# Patient Record
Sex: FEMALE | Race: WHITE | Employment: FULL TIME
[De-identification: names, ages, dates, MRNs, and addresses within clinical notes are randomized per-mention and may not be internally consistent; named-entity substitution may affect disease eponyms.]

---

## 2017-06-14 PROBLEM — Z34.01 ENCOUNTER FOR SUPERVISION OF NORMAL FIRST PREGNANCY IN FIRST TRIMESTER (HCC): Status: ACTIVE | Noted: 2017-06-14

## 2017-06-14 PROBLEM — Z34.01 ENCOUNTER FOR SUPERVISION OF NORMAL FIRST PREGNANCY IN FIRST TRIMESTER: Status: ACTIVE | Noted: 2017-06-14

## 2017-06-14 PROCEDURE — 87591 N.GONORRHOEAE DNA AMP PROB: CPT | Performed by: OBSTETRICS & GYNECOLOGY

## 2017-06-14 PROCEDURE — 87491 CHLMYD TRACH DNA AMP PROBE: CPT | Performed by: OBSTETRICS & GYNECOLOGY

## 2017-06-20 PROCEDURE — 86901 BLOOD TYPING SEROLOGIC RH(D): CPT | Performed by: OBSTETRICS & GYNECOLOGY

## 2017-06-20 PROCEDURE — 86900 BLOOD TYPING SEROLOGIC ABO: CPT | Performed by: OBSTETRICS & GYNECOLOGY

## 2017-06-20 PROCEDURE — 87340 HEPATITIS B SURFACE AG IA: CPT | Performed by: OBSTETRICS & GYNECOLOGY

## 2017-06-20 PROCEDURE — 86780 TREPONEMA PALLIDUM: CPT | Performed by: OBSTETRICS & GYNECOLOGY

## 2017-06-20 PROCEDURE — 36415 COLL VENOUS BLD VENIPUNCTURE: CPT | Performed by: OBSTETRICS & GYNECOLOGY

## 2017-06-20 PROCEDURE — 85025 COMPLETE CBC W/AUTO DIFF WBC: CPT | Performed by: OBSTETRICS & GYNECOLOGY

## 2017-06-20 PROCEDURE — 87389 HIV-1 AG W/HIV-1&-2 AB AG IA: CPT | Performed by: OBSTETRICS & GYNECOLOGY

## 2017-06-20 PROCEDURE — 86762 RUBELLA ANTIBODY: CPT | Performed by: OBSTETRICS & GYNECOLOGY

## 2017-06-20 PROCEDURE — 86850 RBC ANTIBODY SCREEN: CPT | Performed by: OBSTETRICS & GYNECOLOGY

## 2017-06-26 ENCOUNTER — SURGERY (OUTPATIENT)
Age: 32
End: 2017-06-26

## 2017-06-26 ENCOUNTER — ANESTHESIA EVENT (OUTPATIENT)
Dept: SURGERY | Facility: HOSPITAL | Age: 32
End: 2017-06-26

## 2017-06-26 ENCOUNTER — ANESTHESIA (OUTPATIENT)
Dept: SURGERY | Facility: HOSPITAL | Age: 32
End: 2017-06-26

## 2017-06-26 ENCOUNTER — HOSPITAL ENCOUNTER (OUTPATIENT)
Facility: HOSPITAL | Age: 32
Setting detail: HOSPITAL OUTPATIENT SURGERY
Discharge: HOME | End: 2017-06-26
Attending: OBSTETRICS & GYNECOLOGY | Admitting: OBSTETRICS & GYNECOLOGY
Payer: COMMERCIAL

## 2017-06-26 VITALS
HEART RATE: 83 BPM | BODY MASS INDEX: 32.49 KG/M2 | DIASTOLIC BLOOD PRESSURE: 83 MMHG | WEIGHT: 195 LBS | TEMPERATURE: 98 F | SYSTOLIC BLOOD PRESSURE: 121 MMHG | HEIGHT: 65 IN | RESPIRATION RATE: 18 BRPM | OXYGEN SATURATION: 100 %

## 2017-06-26 PROCEDURE — 88305 TISSUE EXAM BY PATHOLOGIST: CPT | Performed by: OBSTETRICS & GYNECOLOGY

## 2017-06-26 PROCEDURE — 10D17ZZ EXTRACTION OF PRODUCTS OF CONCEPTION, RETAINED, VIA NATURAL OR ARTIFICIAL OPENING: ICD-10-PCS | Performed by: OBSTETRICS & GYNECOLOGY

## 2017-06-26 RX ORDER — ONDANSETRON 2 MG/ML
4 INJECTION INTRAMUSCULAR; INTRAVENOUS AS NEEDED
Status: DISCONTINUED | OUTPATIENT
Start: 2017-06-26 | End: 2017-06-26

## 2017-06-26 RX ORDER — SODIUM CHLORIDE, SODIUM LACTATE, POTASSIUM CHLORIDE, CALCIUM CHLORIDE 600; 310; 30; 20 MG/100ML; MG/100ML; MG/100ML; MG/100ML
INJECTION, SOLUTION INTRAVENOUS CONTINUOUS
Status: DISCONTINUED | OUTPATIENT
Start: 2017-06-26 | End: 2017-06-26

## 2017-06-26 RX ORDER — HYDROCODONE BITARTRATE AND ACETAMINOPHEN 5; 325 MG/1; MG/1
2 TABLET ORAL AS NEEDED
Status: DISCONTINUED | OUTPATIENT
Start: 2017-06-26 | End: 2017-06-26

## 2017-06-26 RX ORDER — NALOXONE HYDROCHLORIDE 0.4 MG/ML
80 INJECTION, SOLUTION INTRAMUSCULAR; INTRAVENOUS; SUBCUTANEOUS AS NEEDED
Status: DISCONTINUED | OUTPATIENT
Start: 2017-06-26 | End: 2017-06-26

## 2017-06-26 RX ORDER — HYDROCODONE BITARTRATE AND ACETAMINOPHEN 5; 325 MG/1; MG/1
1 TABLET ORAL AS NEEDED
Status: DISCONTINUED | OUTPATIENT
Start: 2017-06-26 | End: 2017-06-26

## 2017-06-26 RX ORDER — HYDROMORPHONE HYDROCHLORIDE 1 MG/ML
0.4 INJECTION, SOLUTION INTRAMUSCULAR; INTRAVENOUS; SUBCUTANEOUS EVERY 5 MIN PRN
Status: DISCONTINUED | OUTPATIENT
Start: 2017-06-26 | End: 2017-06-26

## 2017-06-26 NOTE — ANESTHESIA POSTPROCEDURE EVALUATION
[address] [place] [address] 280 Mockapetris Patient Status:  Hospital Outpatient Surgery   Age/Gender 36year old female MRN [id number]   Location [address] [place] [address] Attending Raffy Manzo MD   [address] [facility] Day # 0 [facility] Jagdish Amin

## 2017-06-26 NOTE — ANESTHESIA PREPROCEDURE EVALUATION
PRE-OP EVALUATION    Patient Name: Michi Adrian Mockapetris    Pre-op Diagnosis: MISSED     Procedure(s):  SUCTION DILATION AND CURETTAGE    Surgeon(s) and Role:     Rosario Negron MD - Primary    Pre-op vitals reviewed.   Temp: 98.2 °F (36.8 °C 06/20/2017   MCH 29.9 06/20/2017   MCHC 33.9 06/20/2017   RDW 12.1 06/20/2017   .0 06/20/2017               Airway      Mallampati: II  Mouth opening: >3 FB  TM distance: > 6 cm  Neck ROM: full Cardiovascular    Cardiovascular exam normal.         D

## 2017-06-26 NOTE — OPERATIVE REPORT
OPERATIVE REPORT      PREOPERATIVE DIAGNOSIS:  Missed . POSTOPERATIVE DIAGNOSIS:  Missed . PROCEDURE PERFORMED:  Suction D and C. ANESTHESIA:  MAC. OPERATIVE FINDINGS:  There were normal endometrial contours.  Large amount of produc

## 2017-06-26 NOTE — BRIEF OP NOTE
Pre-Operative Diagnosis: MISSED      Post-Operative Diagnosis: MISSED      Procedure Performed:   Procedure(s):  SUCTION DILATION AND CURETTAGE    Surgeon(s) and Role:     Richard Lui MD - Primary    Assistant(s):        Surgical

## 2017-06-26 NOTE — H&P
Dharmesh Camejo is a 36year old female. No chief complaint on file. HPI:    This is a 35 yo primagravid woman who presents for a D&C due to missed .   She had an initial normal prenatal visit, but subsequently saw some passage of possi Neurological: normal    Abdomen: normal          TV ultrasound shows a 8+ week embryonic demise    ASSESSMENT/ PLAN:   Missed  in first trimester  Discussed diagnosis of embryonic loss, potential etiologies, options of observation versus surgical m

## 2017-06-27 ENCOUNTER — TELEPHONE (OUTPATIENT)
Dept: OBGYN UNIT | Facility: HOSPITAL | Age: 32
End: 2017-06-27

## 2017-07-11 PROBLEM — Z34.01 ENCOUNTER FOR SUPERVISION OF NORMAL FIRST PREGNANCY IN FIRST TRIMESTER (HCC): Status: RESOLVED | Noted: 2017-06-14 | Resolved: 2017-07-11

## 2017-07-11 PROBLEM — Z34.01 ENCOUNTER FOR SUPERVISION OF NORMAL FIRST PREGNANCY IN FIRST TRIMESTER: Status: RESOLVED | Noted: 2017-06-14 | Resolved: 2017-07-11

## 2018-05-15 PROBLEM — J34.2 NASAL SEPTAL DEVIATION: Status: ACTIVE | Noted: 2018-05-15

## 2018-05-15 PROBLEM — R09.82 POST-NASAL DRIP: Status: ACTIVE | Noted: 2018-05-15

## 2018-05-15 PROBLEM — J35.8 TONSIL STONE: Status: ACTIVE | Noted: 2018-05-15

## 2018-05-15 PROBLEM — R09.81 NASAL CONGESTION: Status: ACTIVE | Noted: 2018-05-15

## 2018-05-23 PROBLEM — R09.81 NASAL CONGESTION: Status: RESOLVED | Noted: 2018-05-15 | Resolved: 2018-05-23

## 2018-05-23 PROBLEM — R09.82 POST-NASAL DRIP: Status: RESOLVED | Noted: 2018-05-15 | Resolved: 2018-05-23

## 2018-05-23 PROBLEM — E66.9 OBESITY (BMI 30-39.9): Status: ACTIVE | Noted: 2018-05-23

## 2018-05-23 PROCEDURE — 88175 CYTOPATH C/V AUTO FLUID REDO: CPT | Performed by: FAMILY MEDICINE

## 2018-05-23 PROCEDURE — 87624 HPV HI-RISK TYP POOLED RSLT: CPT | Performed by: FAMILY MEDICINE

## 2018-08-15 PROCEDURE — 86901 BLOOD TYPING SEROLOGIC RH(D): CPT | Performed by: OBSTETRICS & GYNECOLOGY

## 2018-08-15 PROCEDURE — 86780 TREPONEMA PALLIDUM: CPT | Performed by: OBSTETRICS & GYNECOLOGY

## 2018-08-15 PROCEDURE — 86850 RBC ANTIBODY SCREEN: CPT | Performed by: OBSTETRICS & GYNECOLOGY

## 2018-08-15 PROCEDURE — 86762 RUBELLA ANTIBODY: CPT | Performed by: OBSTETRICS & GYNECOLOGY

## 2018-08-15 PROCEDURE — 86900 BLOOD TYPING SEROLOGIC ABO: CPT | Performed by: OBSTETRICS & GYNECOLOGY

## 2018-08-15 PROCEDURE — 87340 HEPATITIS B SURFACE AG IA: CPT | Performed by: OBSTETRICS & GYNECOLOGY

## 2018-08-15 PROCEDURE — 87389 HIV-1 AG W/HIV-1&-2 AB AG IA: CPT | Performed by: OBSTETRICS & GYNECOLOGY

## 2018-08-17 PROBLEM — B00.9 HSV INFECTION: Status: ACTIVE | Noted: 2018-08-17

## 2018-09-18 PROCEDURE — 87086 URINE CULTURE/COLONY COUNT: CPT | Performed by: OBSTETRICS & GYNECOLOGY

## 2018-10-11 PROCEDURE — 36415 COLL VENOUS BLD VENIPUNCTURE: CPT | Performed by: OBSTETRICS & GYNECOLOGY

## 2018-10-11 PROCEDURE — 82105 ALPHA-FETOPROTEIN SERUM: CPT | Performed by: OBSTETRICS & GYNECOLOGY

## 2018-10-29 ENCOUNTER — OFFICE VISIT (OUTPATIENT)
Dept: PERINATAL CARE | Facility: HOSPITAL | Age: 33
End: 2018-10-29
Attending: OBSTETRICS & GYNECOLOGY
Payer: COMMERCIAL

## 2018-10-29 VITALS
HEIGHT: 65 IN | WEIGHT: 199 LBS | SYSTOLIC BLOOD PRESSURE: 130 MMHG | HEART RATE: 80 BPM | DIASTOLIC BLOOD PRESSURE: 86 MMHG | BODY MASS INDEX: 33.15 KG/M2

## 2018-10-29 DIAGNOSIS — O09.812 PREGNANCY RESULTING FROM IN VITRO FERTILIZATION IN SECOND TRIMESTER: ICD-10-CM

## 2018-10-29 DIAGNOSIS — E66.9 OBESITY (BMI 30-39.9): ICD-10-CM

## 2018-10-29 PROCEDURE — 99243 OFF/OP CNSLTJ NEW/EST LOW 30: CPT | Performed by: OBSTETRICS & GYNECOLOGY

## 2018-10-29 PROCEDURE — 76811 OB US DETAILED SNGL FETUS: CPT | Performed by: OBSTETRICS & GYNECOLOGY

## 2018-10-29 NOTE — PROGRESS NOTES
Indication: IVF.   ____________________________________________________________________________  History: Age: 34 years.  : 2 Para: 0.  ____________________________________________________________________________  Dating:  LMP: 2018 EDC: 03/1 mm.  ____________________________________________________________________________  Comments:    Dear Dr. Moustapha Lorenz,       Thank you for requesting  an ultrasound and consultation for  [facility]  regarding IVF( Dr. Babs Gu).  Her prenatal records a No ultrasound evidence of markers for aneuploidy are seen. She understands that ultrasound exam cannot exclude genetic abnormalities. The limitations of ultrasound were discussed. IMPRESSION:    1.  IUP at  20 0/7 wks    2.   Scan consistent with da

## 2018-11-27 ENCOUNTER — OFFICE VISIT (OUTPATIENT)
Dept: PERINATAL CARE | Facility: HOSPITAL | Age: 33
End: 2018-11-27
Attending: OBSTETRICS & GYNECOLOGY
Payer: COMMERCIAL

## 2018-11-27 VITALS
HEART RATE: 97 BPM | DIASTOLIC BLOOD PRESSURE: 84 MMHG | BODY MASS INDEX: 34 KG/M2 | SYSTOLIC BLOOD PRESSURE: 136 MMHG | WEIGHT: 204 LBS

## 2018-11-27 DIAGNOSIS — O99.212 OBESITY AFFECTING PREGNANCY IN SECOND TRIMESTER: ICD-10-CM

## 2018-11-27 DIAGNOSIS — O09.812 PREGNANCY RESULTING FROM ASSISTED REPRODUCTIVE TECHNOLOGY, SECOND TRIMESTER: ICD-10-CM

## 2018-11-27 PROCEDURE — 93325 DOPPLER ECHO COLOR FLOW MAPG: CPT | Performed by: OBSTETRICS & GYNECOLOGY

## 2018-11-27 PROCEDURE — 76825 ECHO EXAM OF FETAL HEART: CPT | Performed by: OBSTETRICS & GYNECOLOGY

## 2018-11-27 PROCEDURE — 99215 OFFICE O/P EST HI 40 MIN: CPT | Performed by: OBSTETRICS & GYNECOLOGY

## 2018-11-27 PROCEDURE — 76827 ECHO EXAM OF FETAL HEART: CPT | Performed by: OBSTETRICS & GYNECOLOGY

## 2018-12-18 PROCEDURE — 86780 TREPONEMA PALLIDUM: CPT | Performed by: OBSTETRICS & GYNECOLOGY

## 2018-12-18 PROCEDURE — 87389 HIV-1 AG W/HIV-1&-2 AB AG IA: CPT | Performed by: OBSTETRICS & GYNECOLOGY

## 2019-01-21 NOTE — PROGRESS NOTES
Randall Gtz  Dear Dr. Loyde Buerger     Thank you for requesting ultrasound evaluation and maternal fetal medicine consultation on your patient Ana Maria Rose.   As you are aware she is a 34year old female  with a bladder. Brain: Visualized and normal appearance: brain parenchyma, posterior fossa, cavum septi pellucidi. Spine: appears normal but suboptimal  Heart: Visualized and normal appearance: four-chamber view, right outflow tract.    Left outflow tract: sub

## 2019-01-22 ENCOUNTER — OFFICE VISIT (OUTPATIENT)
Dept: PERINATAL CARE | Facility: HOSPITAL | Age: 34
End: 2019-01-22
Attending: OBSTETRICS & GYNECOLOGY
Payer: COMMERCIAL

## 2019-01-22 VITALS
WEIGHT: 219 LBS | HEIGHT: 65 IN | BODY MASS INDEX: 36.49 KG/M2 | DIASTOLIC BLOOD PRESSURE: 82 MMHG | SYSTOLIC BLOOD PRESSURE: 128 MMHG | HEART RATE: 105 BPM

## 2019-01-22 DIAGNOSIS — O09.812 PREGNANCY RESULTING FROM ASSISTED REPRODUCTIVE TECHNOLOGY, SECOND TRIMESTER: ICD-10-CM

## 2019-01-22 DIAGNOSIS — O99.212 OBESITY AFFECTING PREGNANCY IN SECOND TRIMESTER: ICD-10-CM

## 2019-01-22 PROCEDURE — 76805 OB US >/= 14 WKS SNGL FETUS: CPT | Performed by: OBSTETRICS & GYNECOLOGY

## 2019-01-22 PROCEDURE — 99213 OFFICE O/P EST LOW 20 MIN: CPT | Performed by: OBSTETRICS & GYNECOLOGY

## 2019-01-22 PROCEDURE — 76819 FETAL BIOPHYS PROFIL W/O NST: CPT | Performed by: OBSTETRICS & GYNECOLOGY

## 2019-02-23 ENCOUNTER — HOSPITAL ENCOUNTER (OUTPATIENT)
Facility: HOSPITAL | Age: 34
Setting detail: OBSERVATION
Discharge: HOME | End: 2019-02-23
Attending: OBSTETRICS & GYNECOLOGY | Admitting: OBSTETRICS & GYNECOLOGY
Payer: COMMERCIAL

## 2019-02-23 VITALS
HEART RATE: 100 BPM | BODY MASS INDEX: 37.49 KG/M2 | SYSTOLIC BLOOD PRESSURE: 125 MMHG | HEIGHT: 65 IN | TEMPERATURE: 98 F | RESPIRATION RATE: 16 BRPM | DIASTOLIC BLOOD PRESSURE: 79 MMHG | WEIGHT: 225 LBS

## 2019-02-23 PROBLEM — Z34.90 PREGNANCY (HCC): Status: ACTIVE | Noted: 2019-02-23

## 2019-02-23 PROBLEM — Z34.90 PREGNANCY: Status: ACTIVE | Noted: 2019-02-23

## 2019-02-23 LAB
ALBUMIN SERPL-MCNC: 2.7 G/DL (ref 3.4–5)
ALBUMIN/GLOB SERPL: 0.7 {RATIO} (ref 1–2)
ALP LIVER SERPL-CCNC: 127 U/L (ref 37–98)
ALT SERPL-CCNC: 14 U/L (ref 13–56)
ANION GAP SERPL CALC-SCNC: 9 MMOL/L (ref 0–18)
AST SERPL-CCNC: 11 U/L (ref 15–37)
BASOPHILS # BLD AUTO: 0.01 X10(3) UL (ref 0–0.2)
BASOPHILS NFR BLD AUTO: 0.1 %
BILIRUB SERPL-MCNC: 0.4 MG/DL (ref 0.1–2)
BUN BLD-MCNC: 8 MG/DL (ref 7–18)
BUN/CREAT SERPL: 13.1 (ref 10–20)
CALCIUM BLD-MCNC: 9.3 MG/DL (ref 8.5–10.1)
CHLORIDE SERPL-SCNC: 106 MMOL/L (ref 98–107)
CO2 SERPL-SCNC: 24 MMOL/L (ref 21–32)
CREAT BLD-MCNC: 0.61 MG/DL (ref 0.55–1.02)
CREAT UR-SCNC: 1.21 G/24 HR (ref 0.6–1.8)
CREAT UR-SCNC: 41.1 MG/DL
DEPRECATED RDW RBC AUTO: 42.4 FL (ref 35.1–46.3)
EOSINOPHIL # BLD AUTO: 0.02 X10(3) UL (ref 0–0.7)
EOSINOPHIL NFR BLD AUTO: 0.2 %
ERYTHROCYTE [DISTWIDTH] IN BLOOD BY AUTOMATED COUNT: 13.2 % (ref 11–15)
GLOBULIN PLAS-MCNC: 3.7 G/DL (ref 2.8–4.4)
GLUCOSE BLD-MCNC: 93 MG/DL (ref 70–99)
HCT VFR BLD AUTO: 35 % (ref 35–48)
HGB BLD-MCNC: 12.5 G/DL (ref 12–16)
IMM GRANULOCYTES # BLD AUTO: 0.04 X10(3) UL (ref 0–1)
IMM GRANULOCYTES NFR BLD: 0.3 %
LYMPHOCYTES # BLD AUTO: 2.01 X10(3) UL (ref 1–4)
LYMPHOCYTES NFR BLD AUTO: 17.3 %
M PROTEIN 24H UR ELPH-MRATE: 449.8 MG/24 HR (ref ?–149.1)
M PROTEIN MFR SERPL ELPH: 6.4 G/DL (ref 6.4–8.2)
MCH RBC QN AUTO: 31.6 PG (ref 26–34)
MCHC RBC AUTO-ENTMCNC: 35.7 G/DL (ref 31–37)
MCV RBC AUTO: 88.6 FL (ref 80–100)
MONOCYTES # BLD AUTO: 0.68 X10(3) UL (ref 0.1–1)
MONOCYTES NFR BLD AUTO: 5.9 %
NEUTROPHILS # BLD AUTO: 8.83 X10 (3) UL (ref 1.5–7.7)
NEUTROPHILS # BLD AUTO: 8.83 X10(3) UL (ref 1.5–7.7)
NEUTROPHILS NFR BLD AUTO: 76.2 %
OSMOLALITY SERPL CALC.SUM OF ELEC: 286 MOSM/KG (ref 275–295)
PLATELET # BLD AUTO: 285 10(3)UL (ref 150–450)
POTASSIUM SERPL-SCNC: 4.3 MMOL/L (ref 3.5–5.1)
PROT UR-MCNC: 17.8 MG/DL
PROT/CREAT UR-RTO: 0.43
RBC # BLD AUTO: 3.95 X10(6)UL (ref 3.8–5.3)
SODIUM SERPL-SCNC: 139 MMOL/L (ref 136–145)
SPECIMEN VOL UR: 2600 ML
SPECIMEN VOL UR: 2600 ML
URATE SERPL-MCNC: 3.2 MG/DL (ref 2.6–6)
WBC # BLD AUTO: 11.6 X10(3) UL (ref 4–11)

## 2019-02-23 PROCEDURE — 84156 ASSAY OF PROTEIN URINE: CPT | Performed by: OBSTETRICS & GYNECOLOGY

## 2019-02-23 PROCEDURE — 82570 ASSAY OF URINE CREATININE: CPT | Performed by: OBSTETRICS & GYNECOLOGY

## 2019-02-23 PROCEDURE — 36415 COLL VENOUS BLD VENIPUNCTURE: CPT

## 2019-02-23 PROCEDURE — 59025 FETAL NON-STRESS TEST: CPT

## 2019-02-23 PROCEDURE — 99213 OFFICE O/P EST LOW 20 MIN: CPT

## 2019-02-23 PROCEDURE — 96372 THER/PROPH/DIAG INJ SC/IM: CPT

## 2019-02-23 PROCEDURE — 80053 COMPREHEN METABOLIC PANEL: CPT | Performed by: OBSTETRICS & GYNECOLOGY

## 2019-02-23 PROCEDURE — 84550 ASSAY OF BLOOD/URIC ACID: CPT | Performed by: OBSTETRICS & GYNECOLOGY

## 2019-02-23 PROCEDURE — 85025 COMPLETE CBC W/AUTO DIFF WBC: CPT | Performed by: OBSTETRICS & GYNECOLOGY

## 2019-02-23 RX ORDER — BETAMETHASONE SODIUM PHOSPHATE AND BETAMETHASONE ACETATE 3; 3 MG/ML; MG/ML
12 INJECTION, SUSPENSION INTRA-ARTICULAR; INTRALESIONAL; INTRAMUSCULAR; SOFT TISSUE ONCE
Status: COMPLETED | OUTPATIENT
Start: 2019-02-23 | End: 2019-02-23

## 2019-02-23 NOTE — PROGRESS NOTES
d Hospital  Labor and Delivery Prenatal History and Physical Interval Addendum/Triage assessment  Please see full Prenatal Record for this pregnancy      SUBJECTIVE:    Interval History:      This is a pregnancy at 36 weeks who presents to Labor and Deliver

## 2019-02-23 NOTE — PROGRESS NOTES
Discharged to home per ambulatory in stable condition, not in active labor with written and verbal instructions. Patient verbalizes understanding of information given.

## 2019-02-23 NOTE — PROGRESS NOTES
Pt is a 34year old female admitted to TRG2/TRG2-A. Patient presents with:  R/o Pih: presents with 24 hours urine collection (that was not kept refrigerated) for BP check, labs, NST     Pt is  36w5d intra-uterine pregnancy.   History obtained, cons

## 2019-02-24 ENCOUNTER — HOSPITAL ENCOUNTER (OUTPATIENT)
Facility: HOSPITAL | Age: 34
Discharge: HOME | End: 2019-02-24
Attending: OBSTETRICS & GYNECOLOGY | Admitting: OBSTETRICS & GYNECOLOGY
Payer: COMMERCIAL

## 2019-02-24 ENCOUNTER — APPOINTMENT (OUTPATIENT)
Dept: OBGYN CLINIC | Facility: HOSPITAL | Age: 34
End: 2019-02-24
Payer: COMMERCIAL

## 2019-02-24 VITALS — SYSTOLIC BLOOD PRESSURE: 126 MMHG | DIASTOLIC BLOOD PRESSURE: 80 MMHG | HEART RATE: 85 BPM

## 2019-02-24 PROCEDURE — 59025 FETAL NON-STRESS TEST: CPT

## 2019-02-24 PROCEDURE — 96372 THER/PROPH/DIAG INJ SC/IM: CPT

## 2019-02-24 RX ORDER — BETAMETHASONE SODIUM PHOSPHATE AND BETAMETHASONE ACETATE 3; 3 MG/ML; MG/ML
12 INJECTION, SUSPENSION INTRA-ARTICULAR; INTRALESIONAL; INTRAMUSCULAR; SOFT TISSUE ONCE
Status: COMPLETED | OUTPATIENT
Start: 2019-02-24 | End: 2019-02-24

## 2019-02-24 NOTE — NST
Nonstress Test   Patient: Miranda Ambriz Mockapetris    Gestation: 36w6d    NST:       Variability: Moderate           Accelerations: Yes           Decelerations: None            Baseline: 135 BPM           Uterine Irritability: No           Contractions: Irreg

## 2019-02-24 NOTE — PROGRESS NOTES
Pt is a 34year old female admitted to TRG2/TRG2-A. Patient presents with:  Betamethasone Injection: pt presents for nst, beta injection and bp checks     Pt is  36w6d intra-uterine pregnancy. History obtained, consents signed.  Oriented to room,

## 2019-02-24 NOTE — PROGRESS NOTES
Discharged to home per ambulatory in stable condition with written and verbal instructions.   Pt to return Monday, 2/25/19 for cdil induction

## 2019-02-25 ENCOUNTER — HOSPITAL ENCOUNTER (INPATIENT)
Facility: HOSPITAL | Age: 34
LOS: 4 days | Discharge: HOME | End: 2019-03-01
Attending: OBSTETRICS & GYNECOLOGY | Admitting: OBSTETRICS & GYNECOLOGY
Payer: COMMERCIAL

## 2019-02-25 ENCOUNTER — TELEPHONE (OUTPATIENT)
Dept: OBGYN UNIT | Facility: HOSPITAL | Age: 34
End: 2019-02-25

## 2019-02-25 ENCOUNTER — APPOINTMENT (OUTPATIENT)
Dept: OBGYN CLINIC | Facility: HOSPITAL | Age: 34
End: 2019-02-25
Payer: COMMERCIAL

## 2019-02-25 LAB
ALBUMIN SERPL-MCNC: 2.9 G/DL (ref 3.4–5)
ALBUMIN/GLOB SERPL: 0.7 {RATIO} (ref 1–2)
ALP LIVER SERPL-CCNC: 124 U/L (ref 37–98)
ALT SERPL-CCNC: 15 U/L (ref 13–56)
ANION GAP SERPL CALC-SCNC: 12 MMOL/L (ref 0–18)
ANTIBODY SCREEN: NEGATIVE
AST SERPL-CCNC: 28 U/L (ref 15–37)
BILIRUB SERPL-MCNC: 0.3 MG/DL (ref 0.1–2)
BUN BLD-MCNC: 7 MG/DL (ref 7–18)
BUN/CREAT SERPL: 11.7 (ref 10–20)
CALCIUM BLD-MCNC: 8.7 MG/DL (ref 8.5–10.1)
CHLORIDE SERPL-SCNC: 107 MMOL/L (ref 98–107)
CO2 SERPL-SCNC: 21 MMOL/L (ref 21–32)
CREAT BLD-MCNC: 0.6 MG/DL (ref 0.55–1.02)
DEPRECATED RDW RBC AUTO: 44.5 FL (ref 35.1–46.3)
ERYTHROCYTE [DISTWIDTH] IN BLOOD BY AUTOMATED COUNT: 13.5 % (ref 11–15)
GLOBULIN PLAS-MCNC: 3.9 G/DL (ref 2.8–4.4)
GLUCOSE BLD-MCNC: 114 MG/DL (ref 70–99)
HCT VFR BLD AUTO: 34.8 % (ref 35–48)
HGB BLD-MCNC: 11.8 G/DL (ref 12–16)
M PROTEIN MFR SERPL ELPH: 6.8 G/DL (ref 6.4–8.2)
MCH RBC QN AUTO: 31 PG (ref 26–34)
MCHC RBC AUTO-ENTMCNC: 33.9 G/DL (ref 31–37)
MCV RBC AUTO: 91.3 FL (ref 80–100)
OSMOLALITY SERPL CALC.SUM OF ELEC: 289 MOSM/KG (ref 275–295)
PLATELET # BLD AUTO: 344 10(3)UL (ref 150–450)
POTASSIUM SERPL-SCNC: 3.8 MMOL/L (ref 3.5–5.1)
RBC # BLD AUTO: 3.81 X10(6)UL (ref 3.8–5.3)
RH BLOOD TYPE: POSITIVE
SODIUM SERPL-SCNC: 140 MMOL/L (ref 136–145)
T PALLIDUM AB SER QL IA: NONREACTIVE
URATE SERPL-MCNC: 3.2 MG/DL (ref 2.6–6)
WBC # BLD AUTO: 16.8 X10(3) UL (ref 4–11)

## 2019-02-25 PROCEDURE — 80053 COMPREHEN METABOLIC PANEL: CPT | Performed by: OBSTETRICS & GYNECOLOGY

## 2019-02-25 PROCEDURE — 86850 RBC ANTIBODY SCREEN: CPT | Performed by: OBSTETRICS & GYNECOLOGY

## 2019-02-25 PROCEDURE — 86780 TREPONEMA PALLIDUM: CPT | Performed by: OBSTETRICS & GYNECOLOGY

## 2019-02-25 PROCEDURE — 86901 BLOOD TYPING SEROLOGIC RH(D): CPT | Performed by: OBSTETRICS & GYNECOLOGY

## 2019-02-25 PROCEDURE — 84550 ASSAY OF BLOOD/URIC ACID: CPT | Performed by: OBSTETRICS & GYNECOLOGY

## 2019-02-25 PROCEDURE — 86900 BLOOD TYPING SEROLOGIC ABO: CPT | Performed by: OBSTETRICS & GYNECOLOGY

## 2019-02-25 PROCEDURE — 85027 COMPLETE CBC AUTOMATED: CPT | Performed by: OBSTETRICS & GYNECOLOGY

## 2019-02-25 RX ORDER — SODIUM CHLORIDE, SODIUM LACTATE, POTASSIUM CHLORIDE, CALCIUM CHLORIDE 600; 310; 30; 20 MG/100ML; MG/100ML; MG/100ML; MG/100ML
INJECTION, SOLUTION INTRAVENOUS CONTINUOUS
Status: DISCONTINUED | OUTPATIENT
Start: 2019-02-25 | End: 2019-02-27

## 2019-02-25 RX ORDER — DEXTROSE, SODIUM CHLORIDE, SODIUM LACTATE, POTASSIUM CHLORIDE, AND CALCIUM CHLORIDE 5; .6; .31; .03; .02 G/100ML; G/100ML; G/100ML; G/100ML; G/100ML
INJECTION, SOLUTION INTRAVENOUS AS NEEDED
Status: DISCONTINUED | OUTPATIENT
Start: 2019-02-25 | End: 2019-02-27

## 2019-02-25 RX ORDER — TRISODIUM CITRATE DIHYDRATE AND CITRIC ACID MONOHYDRATE 500; 334 MG/5ML; MG/5ML
30 SOLUTION ORAL AS NEEDED
Status: DISCONTINUED | OUTPATIENT
Start: 2019-02-25 | End: 2019-02-27

## 2019-02-25 RX ORDER — TERBUTALINE SULFATE 1 MG/ML
0.25 INJECTION, SOLUTION SUBCUTANEOUS AS NEEDED
Status: DISCONTINUED | OUTPATIENT
Start: 2019-02-25 | End: 2019-02-27

## 2019-02-25 RX ORDER — ZOLPIDEM TARTRATE 5 MG/1
5 TABLET ORAL NIGHTLY PRN
Status: DISCONTINUED | OUTPATIENT
Start: 2019-02-25 | End: 2019-02-27

## 2019-02-25 RX ORDER — IBUPROFEN 600 MG/1
600 TABLET ORAL ONCE AS NEEDED
Status: DISCONTINUED | OUTPATIENT
Start: 2019-02-25 | End: 2019-02-27

## 2019-02-25 NOTE — PROGRESS NOTES
Pt is a 34year old female admitted to 104/-A. Patient presents with:  Scheduled Induction     Pt is  37w0d intra-uterine pregnancy. History obtained, consents signed. Oriented to room, staff, and plan of care.

## 2019-02-26 RX ORDER — FAMOTIDINE 20 MG/1
20 TABLET ORAL 2 TIMES DAILY
Status: DISCONTINUED | OUTPATIENT
Start: 2019-02-26 | End: 2019-02-27

## 2019-02-26 NOTE — PROGRESS NOTES
[facility]      Labor Progress Note    Tien Carmen Patient Status:  Inpatient    1985 MRN [id number]   Location [address] [facility] Attending Day Manley MD   Hosp Day # 1 PCP Jyotsna Flowers DO      SUBJECTIVE:    Inter

## 2019-02-26 NOTE — H&P
[address] and Delivery Prenatal History and Physical Interval Addendum  Please see full Prenatal Record for this pregnancy      SUBJECTIVE:    Interval History: This is a pregnancy at 37w1d weeks admitted for IOL due to mild pre-eclampsia.

## 2019-02-27 PROCEDURE — 0KQM0ZZ REPAIR PERINEUM MUSCLE, OPEN APPROACH: ICD-10-PCS | Performed by: OBSTETRICS & GYNECOLOGY

## 2019-02-27 PROCEDURE — 3E033VJ INTRODUCTION OF OTHER HORMONE INTO PERIPHERAL VEIN, PERCUTANEOUS APPROACH: ICD-10-PCS | Performed by: OBSTETRICS & GYNECOLOGY

## 2019-02-27 PROCEDURE — 3E0P7VZ INTRODUCTION OF HORMONE INTO FEMALE REPRODUCTIVE, VIA NATURAL OR ARTIFICIAL OPENING: ICD-10-PCS | Performed by: OBSTETRICS & GYNECOLOGY

## 2019-02-27 RX ORDER — CARBOPROST TROMETHAMINE 250 UG/ML
INJECTION, SOLUTION INTRAMUSCULAR
Status: DISCONTINUED
Start: 2019-02-27 | End: 2019-02-27 | Stop reason: WASHOUT

## 2019-02-27 RX ORDER — MISOPROSTOL 200 UG/1
TABLET ORAL
Status: DISPENSED
Start: 2019-02-27 | End: 2019-02-28

## 2019-02-27 RX ORDER — DOCUSATE SODIUM 100 MG/1
100 CAPSULE, LIQUID FILLED ORAL
Status: DISCONTINUED | OUTPATIENT
Start: 2019-02-27 | End: 2019-03-01

## 2019-02-27 RX ORDER — NALBUPHINE HCL 10 MG/ML
2.5 AMPUL (ML) INJECTION
Status: DISCONTINUED | OUTPATIENT
Start: 2019-02-27 | End: 2019-02-27

## 2019-02-27 RX ORDER — SIMETHICONE 80 MG
80 TABLET,CHEWABLE ORAL 3 TIMES DAILY PRN
Status: DISCONTINUED | OUTPATIENT
Start: 2019-02-27 | End: 2019-03-01

## 2019-02-27 RX ORDER — IBUPROFEN 600 MG/1
600 TABLET ORAL EVERY 6 HOURS
Status: DISCONTINUED | OUTPATIENT
Start: 2019-02-27 | End: 2019-03-01

## 2019-02-27 RX ORDER — EPHEDRINE SULFATE/0.9% NACL/PF 25 MG/5 ML
5 SYRINGE (ML) INTRAVENOUS AS NEEDED
Status: DISCONTINUED | OUTPATIENT
Start: 2019-02-27 | End: 2019-02-27

## 2019-02-27 RX ORDER — ZOLPIDEM TARTRATE 5 MG/1
5 TABLET ORAL NIGHTLY PRN
Status: DISCONTINUED | OUTPATIENT
Start: 2019-02-27 | End: 2019-03-01

## 2019-02-27 RX ORDER — BISACODYL 10 MG
10 SUPPOSITORY, RECTAL RECTAL ONCE AS NEEDED
Status: DISCONTINUED | OUTPATIENT
Start: 2019-02-27 | End: 2019-03-01

## 2019-02-27 RX ORDER — ACETAMINOPHEN 325 MG/1
650 TABLET ORAL EVERY 6 HOURS PRN
Status: DISCONTINUED | OUTPATIENT
Start: 2019-02-27 | End: 2019-03-01

## 2019-02-27 NOTE — PLAN OF CARE
Problem: Patient/Family Goals  Goal: Patient/Family Long Term Goal  Patient's Long Term Goal: Uncomplicated vaginal delivery    Interventions:  VS per protocol  I&O  Ice chips and sips as tolerated  EFM per protocol  Maintain IV as ordered  Antibiotics as

## 2019-02-27 NOTE — L&D DELIVERY NOTE
Milton, Girl [address] [facility] [[id number]]    Labor Events     labor?:  No   steroids?:  Full Course  Cervical ripening date/time:  2019 1650  Cervical ripening type:  Cervidil  Antibiotics received during labor?:  No  Antibiotics (enter # Completely pink    Heart rate Absent <100 bpm >100 bpm    Reflex irritability No response Grimace Cry or active withdrawal    Muscle tone Limp Some flexion Active motion    Respiratory effort Absent Weak cry; hypoventilation Good, crying               1 Mi

## 2019-02-27 NOTE — PROGRESS NOTES
SUBJECTIVE:   pt without complaints. Feeling painful contractions. She feels ready for pain meds    OBJECTIVE:  VS:  height is 5' 5\" (1.651 m) and weight is 225 lb (102.1 kg). Her oral temperature is 97.7 °F (36.5 °C).  Her blood pressure is 149/75 and he

## 2019-02-27 NOTE — PROGRESS NOTES
OB PN    No complaints. Denies HA, no changes in vision, no RUQ pain.     BP (!) 144/95   Pulse 77   Temp 97.5 °F (36.4 °C) (Oral)   Resp 16   Ht 5' 5\" (1.651 m)   Wt 225 lb (102.1 kg)   LMP 06/11/2018   BMI 37.44 kg/m²      Intake/Output Summary (Last 24

## 2019-02-27 NOTE — PROGRESS NOTES
Report received from Jacinto Mdundo. Pt denies any complaints. POC discussed, pt denies any questions. Call light within reach.

## 2019-02-27 NOTE — PROGRESS NOTES
OB PN    No complaints. Contractions 4-5/10 in pain. Not ready for epidural yet.     /72   Pulse 85   Temp 97.5 °F (36.4 °C) (Oral)   Resp 16   Ht 5' 5\" (1.651 m)   Wt 225 lb (102.1 kg)   LMP 06/11/2018   BMI 37.44 kg/m²   EFM: 150/mod/+acc/-dec  T

## 2019-02-28 LAB
BASOPHILS # BLD AUTO: 0.02 X10(3) UL (ref 0–0.2)
BASOPHILS NFR BLD AUTO: 0.1 %
DEPRECATED RDW RBC AUTO: 45.5 FL (ref 35.1–46.3)
EOSINOPHIL # BLD AUTO: 0.05 X10(3) UL (ref 0–0.7)
EOSINOPHIL NFR BLD AUTO: 0.3 %
ERYTHROCYTE [DISTWIDTH] IN BLOOD BY AUTOMATED COUNT: 13.6 % (ref 11–15)
HCT VFR BLD AUTO: 34.1 % (ref 35–48)
HGB BLD-MCNC: 11.4 G/DL (ref 12–16)
IMM GRANULOCYTES # BLD AUTO: 0.11 X10(3) UL (ref 0–1)
IMM GRANULOCYTES NFR BLD: 0.6 %
LYMPHOCYTES # BLD AUTO: 2.41 X10(3) UL (ref 1–4)
LYMPHOCYTES NFR BLD AUTO: 13.2 %
MCH RBC QN AUTO: 30.7 PG (ref 26–34)
MCHC RBC AUTO-ENTMCNC: 33.4 G/DL (ref 31–37)
MCV RBC AUTO: 91.9 FL (ref 80–100)
MONOCYTES # BLD AUTO: 0.89 X10(3) UL (ref 0.1–1)
MONOCYTES NFR BLD AUTO: 4.9 %
NEUTROPHILS # BLD AUTO: 14.76 X10 (3) UL (ref 1.5–7.7)
NEUTROPHILS # BLD AUTO: 14.76 X10(3) UL (ref 1.5–7.7)
NEUTROPHILS NFR BLD AUTO: 80.9 %
PLATELET # BLD AUTO: 313 10(3)UL (ref 150–450)
RBC # BLD AUTO: 3.71 X10(6)UL (ref 3.8–5.3)
WBC # BLD AUTO: 18.2 X10(3) UL (ref 4–11)

## 2019-02-28 PROCEDURE — 85025 COMPLETE CBC W/AUTO DIFF WBC: CPT | Performed by: OBSTETRICS & GYNECOLOGY

## 2019-02-28 NOTE — PROGRESS NOTES
Patient in stable condition. Admit to room 1110. Id bands verified. Hugs and kisses tags remain in place. Instructional sheets at bedside, reviewed and signed. Infant assessment completed in room. Infant remains with mother.

## 2019-02-28 NOTE — PAYOR COMM NOTE
--------------  ADMISSION REVIEW     Payor: [facility]  Subscriber #:  [id number]  Authorization Number:   [id number]     Admit date: 2/25/19  Admit time: 200       Admitting Physician: Alfredo Higginbotham MD  Attending Physician:  Alfredo Higginbotham MD  Primary C Location [address] [facility] Attending Marquita Serrano MD   Hosp Day # 1 PCP Alejandro Dill DO      SUBJECTIVE:     Interval History: pt reports mild cramping.          OBJECTIVE:     Vital signs in last 24 hours:  Temp:  [97.8 °F (36.6 °C)-98. 1  labor?:  No   steroids?:  Full Course  Cervical ripening date/time:  2019 1650  Cervical ripening type:  Cervidil  Antibiotics received during labor?:  No  Antibiotics (enter # doses in comment):  none  Rupture date/time:  2019 1 Skin color Blue or pale Acrocyanotic Completely pink     Heart rate Absent <100 bpm >100 bpm     Reflex irritability No response Grimace Cry or active withdrawal     Muscle tone Limp Some flexion Active motion     Respiratory effort Absent Weak cry; hypove HCT  35.0  34.8*   MCV  88.6  91.3   MCH  31.6  31.0   MCHC  35.7  33.9   RDW  13.2  13.5   NEPRELIM  8.83*   --    WBC  11.6*  16.8*   PLT  285.0  344. 0         Impression:       PPD#1  Plan:                   Continue postpartum care.

## 2019-02-28 NOTE — PLAN OF CARE
ANXIETY    • Will report anxiety at manageable levels Completed        BIRTH - VAGINAL/ SECTION    • Fetal and maternal status remain reassuring during the birth process Completed          PAIN - ADULT    • Verbalizes/displays adequate comfort leve

## 2019-02-28 NOTE — PROGRESS NOTES
Pt assisted up to bathroom with RN and OBT, ambulated with a steady gait but unable to void. pericare given and explained and pad changed after using tucks and spray. To mother baby unit via wheelchair holding her daughter with a patent iv.

## 2019-02-28 NOTE — PROGRESS NOTES
Postpartum Day 1    Pt without complaints.     Temp: 97.5 °F (36.4 °C)  Pulse: 85  Resp: 18  BP: 139/82  abd  soft, NT, ND, fundus firm below umbilicus  perineum NL lochia  extr  trace edema, no calf tenderness  Recent Labs   Lab  02/23/19   0956  02/25/19

## 2019-03-01 VITALS
TEMPERATURE: 98 F | HEIGHT: 65 IN | WEIGHT: 225 LBS | OXYGEN SATURATION: 100 % | RESPIRATION RATE: 16 BRPM | HEART RATE: 83 BPM | DIASTOLIC BLOOD PRESSURE: 85 MMHG | BODY MASS INDEX: 37.49 KG/M2 | SYSTOLIC BLOOD PRESSURE: 144 MMHG

## 2019-03-01 NOTE — PAYOR COMM NOTE
--------------  DISCHARGE REVIEW    Payor: JEFRY IVAN  Subscriber #:  [id number]  Authorization Number:   [id number]     Admit date: 2/25/19  Admit time:  1468  Discharge Date: 3/1/2019  9:17 AM     Admitting Physician: Eller Collet, MD  Attending Physici

## 2019-03-01 NOTE — PLAN OF CARE
PAIN - ADULT    • Verbalizes/displays adequate comfort level or patient's stated pain goal Completed        Patient/Family Goals    • Patient/Family Long Term Goal Completed    • Patient/Family Short Term Goal Completed        POSTPARTUM    • [address] [facility]

## 2019-03-01 NOTE — PROGRESS NOTES
Andrey Servin Mockapetris Patient Status:  Inpatient    1985 MRN [id number]   [facility] 1SW-J Attending Vince Warren MD   Hosp Day # 4 PCP Itzel Wilson DO     Pt has no complaints  Breast Feeding:  y    Afebrile  VS stable  Abdomen:  S

## 2019-03-04 ENCOUNTER — TELEPHONE (OUTPATIENT)
Dept: OBGYN UNIT | Facility: HOSPITAL | Age: 34
End: 2019-03-04

## 2019-03-06 ENCOUNTER — TELEPHONE (OUTPATIENT)
Dept: OBGYN UNIT | Facility: HOSPITAL | Age: 34
End: 2019-03-06

## 2019-03-20 PROBLEM — O99.212 OBESITY AFFECTING PREGNANCY IN SECOND TRIMESTER: Status: RESOLVED | Noted: 2018-11-27 | Resolved: 2019-03-20

## 2019-03-20 PROBLEM — O09.812 PREGNANCY RESULTING FROM ASSISTED REPRODUCTIVE TECHNOLOGY, SECOND TRIMESTER: Status: RESOLVED | Noted: 2018-11-27 | Resolved: 2019-03-20

## 2019-03-20 PROBLEM — Z34.90 PREGNANCY (HCC): Status: RESOLVED | Noted: 2019-02-23 | Resolved: 2019-03-20

## 2019-03-20 PROBLEM — O09.812 PREGNANCY RESULTING FROM ASSISTED REPRODUCTIVE TECHNOLOGY, SECOND TRIMESTER (HCC): Status: RESOLVED | Noted: 2018-11-27 | Resolved: 2019-03-20

## 2019-03-20 PROBLEM — B00.9 HSV INFECTION: Status: RESOLVED | Noted: 2018-08-17 | Resolved: 2019-03-20

## 2019-03-20 PROBLEM — O99.212 OBESITY AFFECTING PREGNANCY IN SECOND TRIMESTER (HCC): Status: RESOLVED | Noted: 2018-11-27 | Resolved: 2019-03-20

## 2019-03-20 PROBLEM — Z34.90 PREGNANCY: Status: RESOLVED | Noted: 2019-02-23 | Resolved: 2019-03-20

## 2019-04-12 ENCOUNTER — NURSE ONLY (OUTPATIENT)
Dept: LACTATION | Facility: HOSPITAL | Age: 34
End: 2019-04-12
Attending: OBSTETRICS & GYNECOLOGY
Payer: COMMERCIAL

## 2019-04-12 VITALS — TEMPERATURE: 98 F

## 2019-04-12 DIAGNOSIS — B37.0 THRUSH: ICD-10-CM

## 2019-04-12 DIAGNOSIS — O92.79 DISORDER OF LACTATION, POSTPARTUM CONDITION OR COMPLICATION: ICD-10-CM

## 2019-04-12 DIAGNOSIS — O92.29 SORE NIPPLES DUE TO LACTATION: ICD-10-CM

## 2019-04-12 PROCEDURE — 99213 OFFICE O/P EST LOW 20 MIN: CPT

## 2019-04-12 NOTE — PATIENT INSTRUCTIONS
Breastfeeding Suggestions for Plugged Duct/Breast Infection (mastitis)    Prior to handling baby, your breasts, or breast pump, remember to wash hands with soap and water. Breastfeed on demand, wake baby by        1 ½ to 3 hours to feed if sleepy.      A if symptoms worsen. Call lactation consultant [phone] as needed. Schedule an outpatient lactation appointment for feeding evaluation to evaluate if plugged duct or mastitis reoccurs. For additional information:   Reji Mera. l use of antibiotics, steroids, antidepressants and oral contraceptives.   • Pregnancy  •  delivery  • Diabetes (gestational or insulin dependent)  • Anemia  • Obesity  • Humid environment  • Wearing tight clothing  • Moist nursing pads create perfect needed. • Your health care provided may recommend using an anti-yeast cream to your nipples (over-the-counter or a prescription).    •  After nursing or pumping, rinse the milk off your nipples with water, then apply the cream to the nipple and the entire APNO (All Purpose Nipple Ointment) as below:     · Mupirocin 2% ointment (15 grams)     · Betamethasone 0.1% ointment (15 grams)     · To which is added miconazole powder so that the final concentration is 2% miconazole.    This combination gives a been a consultant with [facility] for the [facility] in [place], and has published articles on the subject of breastfeeding in Scientific American and several medical journals.  Dr. Ad Chen has practiced as a physician in [address] least 8-12 times every 24 hours. · Compressing the breast when your baby sucks can increase milk flow. · At least 6-8 wet diapers and at least 3-4 soft, yellow seedy stools every 24 hours.  Use breastfeeding journal.  · Weight gain of at least 4-7 ounces with questions as well. Weight check sooner if wet or stool diapers decrease. Have weight checked again within 1-3 days of decreasing/stopping supplements. For additional information: [place]. [address] with suction low then increase the suction to the highest suction that is comfortable for you. Remember pumping should never be painful. • If breast milk flow is minimal, try increasing suction if it is comfortable to do so.   NOTE: Initially, in the colos hormone levels are higher at night. Increasing milk flow to baby if breastfeeding:   Improve your baby’s position and latch. Positioning:   • Your hand at neck/shoulders, not the back of head.    • Line chin with the bottom of your areola  Latching on:

## 2019-07-30 PROBLEM — Z87.59 HISTORY OF PRE-ECLAMPSIA: Status: ACTIVE | Noted: 2019-07-30

## 2020-04-29 PROBLEM — O09.819 PREGNANCY RESULTING FROM ASSISTED REPRODUCTIVE TECHNOLOGY (HCC): Status: ACTIVE | Noted: 2020-04-29

## 2020-04-29 PROBLEM — Z86.19 HISTORY OF HERPES GENITALIS: Status: ACTIVE | Noted: 2020-04-29

## 2020-04-29 PROBLEM — O09.819 PREGNANCY RESULTING FROM ASSISTED REPRODUCTIVE TECHNOLOGY: Status: ACTIVE | Noted: 2020-04-29

## 2020-07-13 NOTE — PROGRESS NOTES
Rachael Lan Consultation    Dear Dr. Yevgeniy Luna    Thank you for requesting ultrasound evaluation and maternal fetal medicine consultation on your patient Ana Maria Rose.   As you are aware she is a 37year old female  wi Current Outpatient Medications:   •  BABY ASPIRIN OR, Take by mouth., Disp: , Rfl:   •  Misc.  Devices (BREAST PUMP) Does not apply Misc, Breast feeding status mother (Patient not taking: Reported on 6/25/2020 ), Disp: 1 each, Rfl: 0  •  Ergocalciferol (V head, face, spine, neck, skin, chest, abdominal wall, gastrointestinal tract, kidneys, bladder, extremities.     Brain: Visualized and normal appearance: brain parenchyma, cerebral ventricles, [place], choroid plexus, Cisterna Magna, midline falx, rates.  The increased risk of miscarriage in obese women may be because such women often have PCOS or isolated insulin resistance.                  Due to its strong association with obesity in the general population, type 2 diabetes mellitus is one of the appears to be associated with a small increase in the absolute rate of some congenital anomalies, and the risk may increase with increasing maternal weight. The risk of neural tube defects increased significantly with maternal weight.     The analysis foun pregnancy and less than 1 percent in women who had a normotensive first pregnancy (does not apply to abortions).  These relationships were illustrated by a series of 125 women with severe second-trimester preeclampsia followed for five years: 65 percent dev restriction. The weight of evidence indicates that inherited thrombophilias (such as Factor V Leiden mutation, prothrombin gene mutation, protein C or S deficiency, antithrombin III deficiency) are not associated with preeclampsia.  Therefore, sc preeclampsia in women at high risk for developing the disease. Anticoagulation does not prevent recurrence. There is no evidence that any therapy prevents recurrent HELLP syndrome, but data are limited.     Antiplatelet Agents  The observation that ryliela with preeclampsia, especially early onset and with an adverse outcome   · Multifetal gestation  · Chronic hypertension   · Type 1 or 2 diabetes mellitus  · Chronic kidney disease  · Autoimmune disease (antiphospholipid syndrome, systemic lupus erythematosu which could be achieved easily by taking two 81 mg tablets or splitting a 325 mg tablet. For prevention of myocardial infarction and stroke in nonpregnant individuals, aspirin appears to be equally effective at doses between 75 and 325 mg per day.   The saf secondary hypertension or previous pregnancy-related hypertension for prevention of preeclampsia-related stroke.   · The US Preventive Services Task Force (USPSTF) recommends the use of low-dose aspirin (81 mg/day) in women at high risk of developing preecl presentation, family history of preeclampsia).   · The World Health Organization recommends the use of low-dose aspirin (75 mg/day) for high-risk women (history of preeclampsia, diabetes, chronic hypertension, renal or autoimmune disease, or multifetal preg growth.     IMPRESSION:  · IUP at 20w4d  · AMA: low-risk PGD, declined invasive testing  · IVF Gestation  · Obesity  · Prior Preeclampsia  · Marginal cord insertion    RECOMMENDATIONS:  · Continue care with Dr. Roxane Dorman  · Fetal echocardiogram at 24 weeks

## 2020-07-14 ENCOUNTER — OFFICE VISIT (OUTPATIENT)
Dept: PERINATAL CARE | Facility: HOSPITAL | Age: 35
End: 2020-07-14
Attending: OBSTETRICS & GYNECOLOGY
Payer: COMMERCIAL

## 2020-07-14 VITALS
HEART RATE: 91 BPM | SYSTOLIC BLOOD PRESSURE: 128 MMHG | BODY MASS INDEX: 35.99 KG/M2 | HEIGHT: 65 IN | DIASTOLIC BLOOD PRESSURE: 83 MMHG | WEIGHT: 216 LBS

## 2020-07-14 DIAGNOSIS — Z87.59 HISTORY OF PRE-ECLAMPSIA: ICD-10-CM

## 2020-07-14 DIAGNOSIS — O09.812 PREGNANCY RESULTING FROM ASSISTED REPRODUCTIVE TECHNOLOGY, SECOND TRIMESTER: Primary | ICD-10-CM

## 2020-07-14 DIAGNOSIS — O99.212 OBESITY AFFECTING PREGNANCY IN SECOND TRIMESTER: ICD-10-CM

## 2020-07-14 PROCEDURE — 99243 OFF/OP CNSLTJ NEW/EST LOW 30: CPT | Performed by: OBSTETRICS & GYNECOLOGY

## 2020-07-14 PROCEDURE — 76811 OB US DETAILED SNGL FETUS: CPT | Performed by: OBSTETRICS & GYNECOLOGY

## 2020-08-05 NOTE — PROGRESS NOTES
Nilda Barry  Dear Dr. Haydee Jackson,     Thank you for requesting ultrasound evaluation and maternal fetal medicine consultation on your patient Ana Maria Rose.   As you are aware she is a 37year old female  with a pulmonary venus return, or coarctation of the aorta maybe missed. I discussed the results with the patient. Single IUP in breech presentation. Placenta is posterior, fundal.  It is bilobed (not succenturiate) with continuous placenta. .   A 3 vessel placenta. We discussed the recommended plan of care based on her  risk factors.   Elgin Boyer and her significant other, Gregoria Opal, had their questions answered to their satisfaction.     IMPRESSION:  · IUP at 24w5d  · AMA: low-risk PGD  · IVF Gestation

## 2020-08-12 ENCOUNTER — ULTRASOUND ENCOUNTER (OUTPATIENT)
Dept: PERINATAL CARE | Facility: HOSPITAL | Age: 35
End: 2020-08-12
Attending: OBSTETRICS & GYNECOLOGY
Payer: COMMERCIAL

## 2020-08-12 VITALS
WEIGHT: 218 LBS | SYSTOLIC BLOOD PRESSURE: 116 MMHG | HEART RATE: 96 BPM | DIASTOLIC BLOOD PRESSURE: 83 MMHG | BODY MASS INDEX: 36 KG/M2

## 2020-08-12 DIAGNOSIS — O09.812 PREGNANCY RESULTING FROM ASSISTED REPRODUCTIVE TECHNOLOGY, SECOND TRIMESTER: Primary | ICD-10-CM

## 2020-08-12 DIAGNOSIS — Z87.59 HISTORY OF PRE-ECLAMPSIA: ICD-10-CM

## 2020-08-12 DIAGNOSIS — O99.212 OBESITY AFFECTING PREGNANCY IN SECOND TRIMESTER: ICD-10-CM

## 2020-08-12 DIAGNOSIS — O09.812 PREGNANCY RESULTING FROM ASSISTED REPRODUCTIVE TECHNOLOGY, SECOND TRIMESTER: ICD-10-CM

## 2020-08-12 DIAGNOSIS — E66.9 OBESITY (BMI 30-39.9): ICD-10-CM

## 2020-08-12 PROCEDURE — 76825 ECHO EXAM OF FETAL HEART: CPT | Performed by: OBSTETRICS & GYNECOLOGY

## 2020-08-12 PROCEDURE — 93325 DOPPLER ECHO COLOR FLOW MAPG: CPT

## 2020-08-12 PROCEDURE — 93325 DOPPLER ECHO COLOR FLOW MAPG: CPT | Performed by: OBSTETRICS & GYNECOLOGY

## 2020-08-12 PROCEDURE — 99214 OFFICE O/P EST MOD 30 MIN: CPT | Performed by: OBSTETRICS & GYNECOLOGY

## 2020-08-12 PROCEDURE — 76827 ECHO EXAM OF FETAL HEART: CPT | Performed by: OBSTETRICS & GYNECOLOGY

## 2020-08-12 PROCEDURE — 76827 ECHO EXAM OF FETAL HEART: CPT

## 2020-08-19 PROBLEM — O43.199 BILOBED PLACENTA: Status: ACTIVE | Noted: 2020-08-19

## 2020-08-19 PROBLEM — O43.199: Status: ACTIVE | Noted: 2020-08-19

## 2020-09-30 NOTE — PROGRESS NOTES
Logan Fabry    Dear Dr. Nichole Rivera    Thank you for requesting ultrasound evaluation and maternal fetal medicine consultation on your patient Ana Maria Rose.   As you are aware she is a 37year old female  with testing  · IVF Gestation  · Obesity  · Prior Preeclampsia  · Marginal cord insertion     RECOMMENDATIONS:  · Continue care with Dr. Nichole Rivera  · Weekly NST's at 42 weeks.   · Low-dose  mg (1.5 tabs) daily    Thank you for allowing me to participate i

## 2020-10-08 ENCOUNTER — OFFICE VISIT (OUTPATIENT)
Dept: PERINATAL CARE | Facility: HOSPITAL | Age: 35
End: 2020-10-08
Attending: OBSTETRICS & GYNECOLOGY
Payer: COMMERCIAL

## 2020-10-08 VITALS
HEIGHT: 65 IN | BODY MASS INDEX: 37.82 KG/M2 | HEART RATE: 103 BPM | WEIGHT: 227 LBS | SYSTOLIC BLOOD PRESSURE: 134 MMHG | DIASTOLIC BLOOD PRESSURE: 86 MMHG

## 2020-10-08 DIAGNOSIS — O09.812 PREGNANCY RESULTING FROM ASSISTED REPRODUCTIVE TECHNOLOGY, SECOND TRIMESTER: Primary | ICD-10-CM

## 2020-10-08 DIAGNOSIS — O09.812 PREGNANCY RESULTING FROM ASSISTED REPRODUCTIVE TECHNOLOGY, SECOND TRIMESTER: ICD-10-CM

## 2020-10-08 DIAGNOSIS — Z87.59 HISTORY OF PRE-ECLAMPSIA: ICD-10-CM

## 2020-10-08 DIAGNOSIS — E66.9 OBESITY (BMI 30-39.9): ICD-10-CM

## 2020-10-08 DIAGNOSIS — O99.212 OBESITY AFFECTING PREGNANCY IN SECOND TRIMESTER: ICD-10-CM

## 2020-10-08 PROCEDURE — 76819 FETAL BIOPHYS PROFIL W/O NST: CPT | Performed by: OBSTETRICS & GYNECOLOGY

## 2020-10-08 PROCEDURE — 99213 OFFICE O/P EST LOW 20 MIN: CPT | Performed by: OBSTETRICS & GYNECOLOGY

## 2020-10-08 PROCEDURE — 76816 OB US FOLLOW-UP PER FETUS: CPT | Performed by: OBSTETRICS & GYNECOLOGY

## 2020-10-08 PROCEDURE — 76819 FETAL BIOPHYS PROFIL W/O NST: CPT

## 2020-11-13 ENCOUNTER — HOSPITAL ENCOUNTER (INPATIENT)
Facility: HOSPITAL | Age: 35
LOS: 2 days | Discharge: HOME | End: 2020-11-15
Attending: OBSTETRICS & GYNECOLOGY | Admitting: OBSTETRICS & GYNECOLOGY
Payer: COMMERCIAL

## 2020-11-13 ENCOUNTER — ANESTHESIA EVENT (OUTPATIENT)
Dept: OBGYN UNIT | Facility: HOSPITAL | Age: 35
End: 2020-11-13
Payer: COMMERCIAL

## 2020-11-13 ENCOUNTER — ANESTHESIA (OUTPATIENT)
Dept: OBGYN UNIT | Facility: HOSPITAL | Age: 35
End: 2020-11-13
Payer: COMMERCIAL

## 2020-11-13 PROBLEM — Z34.90 PREGNANCY: Status: ACTIVE | Noted: 2020-11-13

## 2020-11-13 PROBLEM — Z34.90 PREGNANCY (HCC): Status: ACTIVE | Noted: 2020-11-13

## 2020-11-13 PROCEDURE — 99214 OFFICE O/P EST MOD 30 MIN: CPT

## 2020-11-13 PROCEDURE — 84156 ASSAY OF PROTEIN URINE: CPT | Performed by: OBSTETRICS & GYNECOLOGY

## 2020-11-13 PROCEDURE — 80053 COMPREHEN METABOLIC PANEL: CPT | Performed by: OBSTETRICS & GYNECOLOGY

## 2020-11-13 PROCEDURE — 85025 COMPLETE CBC W/AUTO DIFF WBC: CPT | Performed by: OBSTETRICS & GYNECOLOGY

## 2020-11-13 PROCEDURE — 86901 BLOOD TYPING SEROLOGIC RH(D): CPT | Performed by: OBSTETRICS & GYNECOLOGY

## 2020-11-13 PROCEDURE — 82570 ASSAY OF URINE CREATININE: CPT | Performed by: OBSTETRICS & GYNECOLOGY

## 2020-11-13 PROCEDURE — 84550 ASSAY OF BLOOD/URIC ACID: CPT | Performed by: OBSTETRICS & GYNECOLOGY

## 2020-11-13 PROCEDURE — 3E033VJ INTRODUCTION OF OTHER HORMONE INTO PERIPHERAL VEIN, PERCUTANEOUS APPROACH: ICD-10-PCS | Performed by: OBSTETRICS & GYNECOLOGY

## 2020-11-13 PROCEDURE — 86850 RBC ANTIBODY SCREEN: CPT | Performed by: OBSTETRICS & GYNECOLOGY

## 2020-11-13 PROCEDURE — 86900 BLOOD TYPING SEROLOGIC ABO: CPT | Performed by: OBSTETRICS & GYNECOLOGY

## 2020-11-13 PROCEDURE — 86780 TREPONEMA PALLIDUM: CPT | Performed by: OBSTETRICS & GYNECOLOGY

## 2020-11-13 RX ORDER — BUPIVACAINE HCL/0.9 % NACL/PF 0.25 %
5 PLASTIC BAG, INJECTION (ML) EPIDURAL AS NEEDED
Status: DISCONTINUED | OUTPATIENT
Start: 2020-11-13 | End: 2020-11-14

## 2020-11-13 RX ORDER — ACETAMINOPHEN 500 MG
500 TABLET ORAL EVERY 6 HOURS PRN
Status: DISCONTINUED | OUTPATIENT
Start: 2020-11-13 | End: 2020-11-14

## 2020-11-13 RX ORDER — TRISODIUM CITRATE DIHYDRATE AND CITRIC ACID MONOHYDRATE 500; 334 MG/5ML; MG/5ML
30 SOLUTION ORAL AS NEEDED
Status: DISCONTINUED | OUTPATIENT
Start: 2020-11-13 | End: 2020-11-14

## 2020-11-13 RX ORDER — ONDANSETRON 2 MG/ML
4 INJECTION INTRAMUSCULAR; INTRAVENOUS EVERY 6 HOURS PRN
Status: DISCONTINUED | OUTPATIENT
Start: 2020-11-13 | End: 2020-11-14

## 2020-11-13 RX ORDER — DEXTROSE, SODIUM CHLORIDE, SODIUM LACTATE, POTASSIUM CHLORIDE, AND CALCIUM CHLORIDE 5; .6; .31; .03; .02 G/100ML; G/100ML; G/100ML; G/100ML; G/100ML
INJECTION, SOLUTION INTRAVENOUS AS NEEDED
Status: DISCONTINUED | OUTPATIENT
Start: 2020-11-13 | End: 2020-11-14

## 2020-11-13 RX ORDER — DIPHENHYDRAMINE HYDROCHLORIDE 50 MG/ML
12.5 INJECTION INTRAMUSCULAR; INTRAVENOUS EVERY 4 HOURS PRN
Status: DISCONTINUED | OUTPATIENT
Start: 2020-11-13 | End: 2020-11-14

## 2020-11-13 RX ORDER — IBUPROFEN 600 MG/1
600 TABLET ORAL EVERY 6 HOURS PRN
Status: DISCONTINUED | OUTPATIENT
Start: 2020-11-13 | End: 2020-11-14 | Stop reason: HOSPADM

## 2020-11-13 RX ORDER — AMMONIA INHALANTS 0.04 G/.3ML
0.3 INHALANT RESPIRATORY (INHALATION) AS NEEDED
Status: DISCONTINUED | OUTPATIENT
Start: 2020-11-13 | End: 2020-11-14

## 2020-11-13 RX ORDER — TERBUTALINE SULFATE 1 MG/ML
0.25 INJECTION, SOLUTION SUBCUTANEOUS AS NEEDED
Status: DISCONTINUED | OUTPATIENT
Start: 2020-11-13 | End: 2020-11-14

## 2020-11-13 RX ORDER — SODIUM CHLORIDE, SODIUM LACTATE, POTASSIUM CHLORIDE, CALCIUM CHLORIDE 600; 310; 30; 20 MG/100ML; MG/100ML; MG/100ML; MG/100ML
INJECTION, SOLUTION INTRAVENOUS CONTINUOUS
Status: DISCONTINUED | OUTPATIENT
Start: 2020-11-13 | End: 2020-11-14

## 2020-11-14 PROCEDURE — 0HQ9XZZ REPAIR PERINEUM SKIN, EXTERNAL APPROACH: ICD-10-PCS | Performed by: OBSTETRICS & GYNECOLOGY

## 2020-11-14 RX ORDER — LABETALOL HYDROCHLORIDE 5 MG/ML
80 INJECTION, SOLUTION INTRAVENOUS ONCE AS NEEDED
Status: DISCONTINUED | OUTPATIENT
Start: 2020-11-14 | End: 2020-11-14

## 2020-11-14 RX ORDER — LABETALOL HYDROCHLORIDE 5 MG/ML
40 INJECTION, SOLUTION INTRAVENOUS ONCE AS NEEDED
Status: DISCONTINUED | OUTPATIENT
Start: 2020-11-14 | End: 2020-11-14

## 2020-11-14 RX ORDER — DIAPER,BRIEF,INFANT-TODD,DISP
EACH MISCELLANEOUS 3 TIMES DAILY PRN
Status: DISCONTINUED | OUTPATIENT
Start: 2020-11-14 | End: 2020-11-15

## 2020-11-14 RX ORDER — BISACODYL 10 MG
10 SUPPOSITORY, RECTAL RECTAL ONCE AS NEEDED
Status: DISCONTINUED | OUTPATIENT
Start: 2020-11-14 | End: 2020-11-15

## 2020-11-14 RX ORDER — SIMETHICONE 80 MG
80 TABLET,CHEWABLE ORAL 3 TIMES DAILY PRN
Status: DISCONTINUED | OUTPATIENT
Start: 2020-11-14 | End: 2020-11-15

## 2020-11-14 RX ORDER — HYDRALAZINE HYDROCHLORIDE 20 MG/ML
10 INJECTION INTRAMUSCULAR; INTRAVENOUS ONCE AS NEEDED
Status: DISCONTINUED | OUTPATIENT
Start: 2020-11-14 | End: 2020-11-14

## 2020-11-14 RX ORDER — DOCUSATE SODIUM 100 MG/1
100 CAPSULE, LIQUID FILLED ORAL
Status: DISCONTINUED | OUTPATIENT
Start: 2020-11-14 | End: 2020-11-15

## 2020-11-14 RX ORDER — IBUPROFEN 600 MG/1
600 TABLET ORAL EVERY 6 HOURS
Status: DISCONTINUED | OUTPATIENT
Start: 2020-11-14 | End: 2020-11-15

## 2020-11-14 RX ORDER — LABETALOL HYDROCHLORIDE 5 MG/ML
20 INJECTION, SOLUTION INTRAVENOUS ONCE AS NEEDED
Status: COMPLETED | OUTPATIENT
Start: 2020-11-14 | End: 2020-11-14

## 2020-11-14 RX ORDER — ACETAMINOPHEN 325 MG/1
650 TABLET ORAL EVERY 6 HOURS PRN
Status: DISCONTINUED | OUTPATIENT
Start: 2020-11-14 | End: 2020-11-15

## 2020-11-14 NOTE — PLAN OF CARE
Problem: SAFETY ADULT - FALL  Goal: Free from fall injury  Description: INTERVENTIONS:  - Assess pt frequently for physical needs  - Identify cognitive and physical deficits and behaviors that affect risk of falls.   - [place] fall precautions as indica

## 2020-11-14 NOTE — ANESTHESIA PREPROCEDURE EVALUATION
PRE-OP EVALUATION    Patient Name: Christopher Andres Mockapetris    Pre-op Diagnosis: IUP @ 38 weeks labor pains    Labor epidural    Pre-op vitals reviewed. Temp: 98.4 °F (36.9 °C)  Pulse: 96  Resp: 16  BP: 126/70  SpO2: 98 %  Body mass index is 38.11 kg/m². Continuous    •  dextrose 5 % /lactated ringers infusion, , Intravenous, PRN    •  oxyTOCIN (PITOCIN) 30 units/ 500 ml 0.9% NS premix infusion, 0.5-20 sascha-units/min, Intravenous, Continuous    •  [COMPLETED] lactated ringers IV bolus 1,000 mL, 1,000 mL, Results   Component Value Date    WBC 10.7 11/13/2020    RBC 4.21 11/13/2020    HGB 13.0 11/13/2020    HGB 12.5 09/09/2020    HCT 37.8 11/13/2020    HCT 37.8 09/09/2020    MCV 89.8 11/13/2020    MCH 30.9 11/13/2020    MCHC 34.4 11/13/2020    RDW 13.3 11/13

## 2020-11-14 NOTE — PLAN OF CARE
Problem: SAFETY ADULT - FALL  Goal: Free from fall injury  Description: INTERVENTIONS:  - Assess pt frequently for physical needs  - Identify cognitive and physical deficits and behaviors that affect risk of falls.   - [place] fall precautions as indica

## 2020-11-14 NOTE — PROGRESS NOTES
Pt is a 37year old female admitted to TRG3/TRG3-A, Patient presents with:  R/o Pih: Pt sent from the office for [facility] evaluation. Pt is 38w0d intra-uterine pregnancy. Denies any leaking of fluid. Reports +fetal movement.    History obtained, consents sig

## 2020-11-14 NOTE — ANESTHESIA PROCEDURE NOTES
Labor Analgesia  Performed by: Hiral Mosqueda MD  Authorized by: Hiral Mosqueda MD       General Information and Staff    Start Time:   Anesthesiologist: Hiral Mosqueda MD  Performed by:   Anesthesiologist  Patient Location:  OB  Site Identification: surface land

## 2020-11-14 NOTE — H&P
[address] Rose Patient Status:  Inpatient    1985 MRN [id number]   Location [address] [facility] Attending Eliseo Mo MD   Hosp Day # 1 PCP Martha Hammond DO     SUBJECTIVE:    He Never Smoker      Smokeless tobacco: Never Used    Alcohol use: No      Home Meds:   •  Ergocalciferol (VITAMIN D OR), Take by mouth., Disp: , Rfl: , 11/12/2020 at 1900    •  VALACYCLOVIR  MG Oral Tab, TAKE 1 TABLET BY MOUTH EVERY DAY, Disp: 30 tabl Neutrophil Absolute Prel* 11/13/2020 7.53    • Neutrophil Absolute 11/13/2020 7.53    • Lymphocyte Absolute 11/13/2020 2.51    • Monocyte Absolute 11/13/2020 0.53    • Eosinophil Absolute 11/13/2020 0.05    • Basophil Absolute 11/13/2020 0.02    • Immature

## 2020-11-14 NOTE — L&D DELIVERY NOTE
Red Rose [[id number]]    Labor Events     labor?: No   steroids?: None  Rupture date/time: 2020 0255     Rupture type: SROM  Fluid color: Clear  Induction: Oxytocin  Indications for induction: Hypertension     Labor Event Times Minute:  20 Minute:    Skin color: 0        Heart rate: 2        Reflex irritablity: 2        Muscle tone: 2        Respiratory effort: 2        Total: 8           Apgars assigned by: Donaldo Escamilla RN   disposition: with mother     Skin to Skin    N

## 2020-11-15 VITALS
TEMPERATURE: 99 F | OXYGEN SATURATION: 98 % | HEART RATE: 85 BPM | SYSTOLIC BLOOD PRESSURE: 137 MMHG | WEIGHT: 229 LBS | BODY MASS INDEX: 38 KG/M2 | DIASTOLIC BLOOD PRESSURE: 87 MMHG | RESPIRATION RATE: 18 BRPM

## 2020-11-15 PROCEDURE — 85025 COMPLETE CBC W/AUTO DIFF WBC: CPT | Performed by: OBSTETRICS & GYNECOLOGY

## 2020-11-15 NOTE — PROGRESS NOTES
Discharge patient home as order. Teaching complete, patient feel comfortable to take care herself and  infant. Hugs and kisses off. Sent both mom and infant to their family car @ [phone].

## 2020-11-15 NOTE — PROGRESS NOTES
Postpartum Day 1    Pt without complaints.     Temp:  [98.6 °F (37 °C)-98.7 °F (37.1 °C)] 98.6 °F (37 °C)  Pulse:  [] 77  Resp:  [14-16] 16  BP: (116-142)/(58-84) 142/84   Patient Vitals for the past 24 hrs:   BP Temp Temp src Pulse Resp   11/14/20 19

## 2020-11-15 NOTE — PROGRESS NOTES
Labor Analgesia Follow Up Note    Patient underwent epidural anesthesia for labor analgesia,    Placenta Date/Time: 11/14/2020  6:08 AM    Delivery Date/Time[de-identified] 11/14/2020  6:03 AM    /84 (BP Location: Right arm)   Pulse 77   Temp 98.6 °F (37 °C) (Ora

## 2020-11-15 NOTE — PROGRESS NOTES
Addendum:  BPs reviewed:  Patient Vitals for the past 24 hrs:   BP Temp Temp src Pulse Resp   11/15/20 1414 137/87 98.8 °F (37.1 °C) Oral 85 18   11/15/20 1404 140/89 — — 81 —   11/15/20 1206 118/79 — — 69 —   11/15/20 1003 114/78 — — 76 —   11/15/20 0754

## 2020-11-17 ENCOUNTER — TELEPHONE (OUTPATIENT)
Dept: OBGYN UNIT | Facility: HOSPITAL | Age: 35
End: 2020-11-17

## 2020-11-17 NOTE — PROGRESS NOTES
Carla Dubon Call completed: Mom reports that she and infant are doing well except now that milk has come in, has had some breastfeeding issues of baby not latching as well. Offered tips and [address] [facility] Outpatient info.    11/17/20 [id number]   Ling Call Follow up

## 2021-06-26 PROBLEM — O09.812 PREGNANCY RESULTING FROM ASSISTED REPRODUCTIVE TECHNOLOGY, SECOND TRIMESTER (HCC): Status: RESOLVED | Noted: 2018-11-27 | Resolved: 2021-06-26

## 2021-06-26 PROBLEM — O99.212 OBESITY AFFECTING PREGNANCY IN SECOND TRIMESTER (HCC): Status: RESOLVED | Noted: 2018-11-27 | Resolved: 2021-06-26

## 2021-06-26 PROBLEM — O09.819 PREGNANCY RESULTING FROM ASSISTED REPRODUCTIVE TECHNOLOGY: Status: RESOLVED | Noted: 2020-04-29 | Resolved: 2021-06-26

## 2021-06-26 PROBLEM — O43.199 BILOBED PLACENTA: Status: RESOLVED | Noted: 2020-08-19 | Resolved: 2021-06-26

## 2021-06-26 PROBLEM — Z34.90 PREGNANCY: Status: RESOLVED | Noted: 2020-11-13 | Resolved: 2021-06-26

## 2021-06-26 PROBLEM — O43.199: Status: RESOLVED | Noted: 2020-08-19 | Resolved: 2021-06-26

## 2021-06-26 PROBLEM — O09.812 PREGNANCY RESULTING FROM ASSISTED REPRODUCTIVE TECHNOLOGY, SECOND TRIMESTER: Status: RESOLVED | Noted: 2018-11-27 | Resolved: 2021-06-26

## 2021-06-26 PROBLEM — O99.212 OBESITY AFFECTING PREGNANCY IN SECOND TRIMESTER: Status: RESOLVED | Noted: 2018-11-27 | Resolved: 2021-06-26

## 2021-06-26 PROBLEM — O09.819 PREGNANCY RESULTING FROM ASSISTED REPRODUCTIVE TECHNOLOGY (HCC): Status: RESOLVED | Noted: 2020-04-29 | Resolved: 2021-06-26

## 2021-06-26 PROBLEM — Z34.90 PREGNANCY (HCC): Status: RESOLVED | Noted: 2020-11-13 | Resolved: 2021-06-26

## 2021-08-10 PROBLEM — E05.90 HYPERTHYROIDISM: Status: ACTIVE | Noted: 2021-08-10

## 2021-08-10 PROBLEM — E06.9 THYROIDITIS: Status: ACTIVE | Noted: 2021-08-10

## 2021-08-14 ENCOUNTER — APPOINTMENT (OUTPATIENT)
Dept: MRI IMAGING | Facility: HOSPITAL | Age: 36
DRG: 060 | End: 2021-08-14
Attending: EMERGENCY MEDICINE
Payer: COMMERCIAL

## 2021-08-14 ENCOUNTER — HOSPITAL ENCOUNTER (INPATIENT)
Facility: HOSPITAL | Age: 36
LOS: 3 days | Discharge: HOME | DRG: 060 | End: 2021-08-17
Attending: EMERGENCY MEDICINE | Admitting: INTERNAL MEDICINE
Payer: COMMERCIAL

## 2021-08-14 DIAGNOSIS — G37.9 CNS DEMYELINATION (HCC): Primary | ICD-10-CM

## 2021-08-14 LAB
ALBUMIN SERPL-MCNC: 4.1 G/DL (ref 3.4–5)
ALBUMIN/GLOB SERPL: 1.1 {RATIO} (ref 1–2)
ALP LIVER SERPL-CCNC: 82 U/L
ALT SERPL-CCNC: 21 U/L
ANION GAP SERPL CALC-SCNC: 5 MMOL/L (ref 0–18)
AST SERPL-CCNC: 18 U/L (ref 15–37)
BASOPHILS # BLD AUTO: 0.03 X10(3) UL (ref 0–0.2)
BASOPHILS NFR BLD AUTO: 0.3 %
BILIRUB SERPL-MCNC: 0.3 MG/DL (ref 0.1–2)
BUN BLD-MCNC: 14 MG/DL (ref 7–18)
CALCIUM BLD-MCNC: 8.9 MG/DL (ref 8.5–10.1)
CHLORIDE SERPL-SCNC: 109 MMOL/L (ref 98–112)
CO2 SERPL-SCNC: 25 MMOL/L (ref 21–32)
CREAT BLD-MCNC: 1 MG/DL
EOSINOPHIL # BLD AUTO: 0.09 X10(3) UL (ref 0–0.7)
EOSINOPHIL NFR BLD AUTO: 0.8 %
ERYTHROCYTE [DISTWIDTH] IN BLOOD BY AUTOMATED COUNT: 12.5 %
GLOBULIN PLAS-MCNC: 3.9 G/DL (ref 2.8–4.4)
GLUCOSE BLD-MCNC: 89 MG/DL (ref 70–99)
HCT VFR BLD AUTO: 38.5 %
HGB BLD-MCNC: 13.1 G/DL
IMM GRANULOCYTES # BLD AUTO: 0.03 X10(3) UL (ref 0–1)
IMM GRANULOCYTES NFR BLD: 0.3 %
LYMPHOCYTES # BLD AUTO: 4.27 X10(3) UL (ref 1–4)
LYMPHOCYTES NFR BLD AUTO: 36.9 %
M PROTEIN MFR SERPL ELPH: 8 G/DL (ref 6.4–8.2)
MCH RBC QN AUTO: 28.1 PG (ref 26–34)
MCHC RBC AUTO-ENTMCNC: 34 G/DL (ref 31–37)
MCV RBC AUTO: 82.6 FL
MONOCYTES # BLD AUTO: 0.58 X10(3) UL (ref 0.1–1)
MONOCYTES NFR BLD AUTO: 5 %
NEUTROPHILS # BLD AUTO: 6.58 X10 (3) UL (ref 1.5–7.7)
NEUTROPHILS # BLD AUTO: 6.58 X10(3) UL (ref 1.5–7.7)
NEUTROPHILS NFR BLD AUTO: 56.7 %
OSMOLALITY SERPL CALC.SUM OF ELEC: 288 MOSM/KG (ref 275–295)
PLATELET # BLD AUTO: 337 10(3)UL (ref 150–450)
POTASSIUM SERPL-SCNC: 3.7 MMOL/L (ref 3.5–5.1)
RBC # BLD AUTO: 4.66 X10(6)UL
SODIUM SERPL-SCNC: 139 MMOL/L (ref 136–145)
WBC # BLD AUTO: 11.6 X10(3) UL (ref 4–11)

## 2021-08-14 PROCEDURE — 72156 MRI NECK SPINE W/O & W/DYE: CPT | Performed by: EMERGENCY MEDICINE

## 2021-08-14 PROCEDURE — 70553 MRI BRAIN STEM W/O & W/DYE: CPT | Performed by: EMERGENCY MEDICINE

## 2021-08-14 RX ORDER — ACETAMINOPHEN 325 MG/1
650 TABLET ORAL EVERY 6 HOURS PRN
Status: DISCONTINUED | OUTPATIENT
Start: 2021-08-14 | End: 2021-08-17

## 2021-08-14 NOTE — ED INITIAL ASSESSMENT (HPI)
numbness and tingling to left side of body, started 1 week ago, had a head CT at immediate care, has seen endocrinology as well and adjusted medications but been getting worse.

## 2021-08-14 NOTE — ED PROVIDER NOTES
Patient Seen in: [facility] Emergency Department      History   Patient presents with:  Numbness Weakness    Stated Complaint: numbness and tingling to left side of body, started 1 week ago    HPI/Subjective:   HPI    73-year-old presents for evalua endometriosis, chromopertubation, drainage of ovarian cyst                Social History    Tobacco Use      Smoking status: Never Smoker      Smokeless tobacco: Never Used    Vaping Use      Vaping Use: Never used    Alcohol use: No    Drug use:  No METABOLIC PANEL (14) - Normal   CBC WITH DIFFERENTIAL WITH PLATELET    Narrative: The following orders were created for panel order CBC With Differential With Platelet.   Procedure                               Abnormality         Status stenosis, or foraminal narrowing. C3-C4:  No significant disc/facet abnormality, spinal stenosis, or foraminal narrowing. C4-C5:  No significant disc/facet abnormality, spinal stenosis, or foraminal narrowing.    C5-C6:  Small central posterior disc pro

## 2021-08-15 LAB
ANION GAP SERPL CALC-SCNC: 6 MMOL/L (ref 0–18)
BUN BLD-MCNC: 13 MG/DL (ref 7–18)
CALCIUM BLD-MCNC: 9.1 MG/DL (ref 8.5–10.1)
CHLORIDE SERPL-SCNC: 109 MMOL/L (ref 98–112)
CO2 SERPL-SCNC: 23 MMOL/L (ref 21–32)
CREAT BLD-MCNC: 0.81 MG/DL
ERYTHROCYTE [DISTWIDTH] IN BLOOD BY AUTOMATED COUNT: 12.4 %
GLUCOSE BLD-MCNC: 159 MG/DL (ref 70–99)
GLUCOSE BLD-MCNC: 162 MG/DL (ref 70–99)
HCT VFR BLD AUTO: 42.4 %
HGB BLD-MCNC: 14.4 G/DL
M PROTEIN MFR SERPL ELPH: 7.9 G/DL (ref 6.4–8.2)
MCH RBC QN AUTO: 28.2 PG (ref 26–34)
MCHC RBC AUTO-ENTMCNC: 34 G/DL (ref 31–37)
MCV RBC AUTO: 83 FL
OSMOLALITY SERPL CALC.SUM OF ELEC: 290 MOSM/KG (ref 275–295)
PLATELET # BLD AUTO: 317 10(3)UL (ref 150–450)
POTASSIUM SERPL-SCNC: 4 MMOL/L (ref 3.5–5.1)
RBC # BLD AUTO: 5.11 X10(6)UL
RHEUMATOID FACT SERPL-ACNC: <10 IU/ML (ref ?–15)
SODIUM SERPL-SCNC: 138 MMOL/L (ref 136–145)
VIT B12 SERPL-MCNC: 624 PG/ML (ref 193–986)
WBC # BLD AUTO: 10.3 X10(3) UL (ref 4–11)

## 2021-08-15 PROCEDURE — 99223 1ST HOSP IP/OBS HIGH 75: CPT | Performed by: OTHER

## 2021-08-15 RX ORDER — MULTIVIT-MIN/IRON FUM/FOLIC AC 7.5 MG-4
1 TABLET ORAL DAILY
COMMUNITY

## 2021-08-15 RX ORDER — METOPROLOL SUCCINATE 25 MG/1
25 TABLET, EXTENDED RELEASE ORAL
Status: DISCONTINUED | OUTPATIENT
Start: 2021-08-15 | End: 2021-08-17

## 2021-08-15 RX ORDER — UBIDECARENONE 75 MG
250 CAPSULE ORAL DAILY
COMMUNITY

## 2021-08-15 RX ORDER — IBUPROFEN 800 MG/1
800 TABLET ORAL
Status: ON HOLD | COMMUNITY
End: 2021-08-16

## 2021-08-15 RX ORDER — CETIRIZINE HYDROCHLORIDE 10 MG/1
10 TABLET ORAL DAILY
Status: DISCONTINUED | OUTPATIENT
Start: 2021-08-16 | End: 2021-08-17

## 2021-08-15 RX ORDER — MULTIPLE VITAMINS W/ MINERALS TAB 9MG-400MCG
1 TAB ORAL DAILY
Status: DISCONTINUED | OUTPATIENT
Start: 2021-08-15 | End: 2021-08-17

## 2021-08-15 RX ORDER — IBUPROFEN 400 MG/1
400 TABLET ORAL ONCE
Status: DISCONTINUED | OUTPATIENT
Start: 2021-08-15 | End: 2021-08-17

## 2021-08-15 RX ORDER — CETIRIZINE HYDROCHLORIDE 10 MG/1
10 TABLET ORAL DAILY
COMMUNITY

## 2021-08-15 NOTE — PLAN OF CARE
States left upper and lower extremities numbness and tingling remains. Moves all extremities well. Instructed on plan of care, call don't fall protocol, call for all asst needed, discomfort felt. Verbalized understanding. Neurology aware of consult.

## 2021-08-15 NOTE — H&P
[facility] Hospitalist H&P       CC: Patient presents with:  Numbness Weakness       PCP: Theresa Vazquez DO    History of Present Illness: 32 y/o f w hx of hyperthyroidism taken off methimazole 8/10 p/w c/o left sided numbness and tingling, began in fingers and reviewed and negative except for what's stated above.      OBJECTIVE:  /83 (BP Location: Right arm)   Pulse 88   Temp 98.2 °F (36.8 °C) (Oral)   Resp 18   Ht 5' 5\" (1.651 m)   Wt 188 lb (85.3 kg)   LMP 07/22/2021 (Exact Date)   SpO2 94%   BMI 31.28 k gliosis and small neoplasm are included in the differential considerations.    Dictated by (CST): Crist Seip, MD on 8/14/2021 at 9:17 PM     Finalized by (CST): Crist Seip, MD on 8/14/2021 at 9:26 PM       MRI SPINE CERVICAL (W+WO) (CPT=72156)    Result

## 2021-08-15 NOTE — PLAN OF CARE
NURSING ADMISSION NOTE      Patient admitted via Cart   Oriented to room. Safety precautions initiated. Bed in low position. Call light in reach. Received from ER awake, A/Ox4, spouse at bedside.  Pt able to ambulate to toilet independently, gait slo

## 2021-08-16 ENCOUNTER — APPOINTMENT (OUTPATIENT)
Dept: GENERAL RADIOLOGY | Facility: HOSPITAL | Age: 36
DRG: 060 | End: 2021-08-16
Attending: Other
Payer: COMMERCIAL

## 2021-08-16 ENCOUNTER — TELEPHONE (OUTPATIENT)
Dept: HEMATOLOGY/ONCOLOGY | Facility: HOSPITAL | Age: 36
End: 2021-08-16

## 2021-08-16 ENCOUNTER — APPOINTMENT (OUTPATIENT)
Dept: MRI IMAGING | Facility: HOSPITAL | Age: 36
DRG: 060 | End: 2021-08-16
Attending: Other
Payer: COMMERCIAL

## 2021-08-16 LAB
ATRIAL RATE: 78 BPM
BILIRUB UR QL STRIP.AUTO: NEGATIVE
CLARITY CSF: CLEAR
CLARITY UR REFRACT.AUTO: CLEAR
COLOR CSF: COLORLESS
COLOR UR AUTO: YELLOW
COUNT PERFORMED ON TUBE: 3
GLUCOSE CSF-MCNC: 62 MG/DL (ref 40–70)
GLUCOSE UR STRIP.AUTO-MCNC: NEGATIVE MG/DL
INR BLD: 1.03 (ref 0.89–1.11)
KETONES UR STRIP.AUTO-MCNC: NEGATIVE MG/DL
LEUKOCYTE ESTERASE UR QL STRIP.AUTO: NEGATIVE
NITRITE UR QL STRIP.AUTO: NEGATIVE
P AXIS: -4 DEGREES
P-R INTERVAL: 144 MS
PH UR STRIP.AUTO: 6 [PH] (ref 5–8)
PROT PATTERN CSF ELPH-IMP: 31.1 MG/DL (ref 15–45)
PROT UR STRIP.AUTO-MCNC: NEGATIVE MG/DL
PSA SERPL DL<=0.01 NG/ML-MCNC: 13.7 SECONDS (ref 12.2–14.5)
Q-T INTERVAL: 374 MS
QRS DURATION: 86 MS
QTC CALCULATION (BEZET): 426 MS
R AXIS: 45 DEGREES
RBC # CSF: 1 /MM3 (ref ?–1)
RBC UR QL AUTO: NEGATIVE
SARS-COV-2 RNA RESP QL NAA+PROBE: NOT DETECTED
SP GR UR STRIP.AUTO: >1.03 (ref 1–1.03)
T AXIS: 42 DEGREES
TOTAL VOLUME CSF: 12 ML
UROBILINOGEN UR STRIP.AUTO-MCNC: <2 MG/DL
VENTRICULAR RATE: 78 BPM
WBC # FLD MANUAL: 3 /MM3 (ref 0–5)

## 2021-08-16 PROCEDURE — 99232 SBSQ HOSP IP/OBS MODERATE 35: CPT | Performed by: OTHER

## 2021-08-16 PROCEDURE — 72157 MRI CHEST SPINE W/O & W/DYE: CPT | Performed by: OTHER

## 2021-08-16 PROCEDURE — 009U3ZX DRAINAGE OF SPINAL CANAL, PERCUTANEOUS APPROACH, DIAGNOSTIC: ICD-10-PCS | Performed by: RADIOLOGY

## 2021-08-16 PROCEDURE — 62328 DX LMBR SPI PNXR W/FLUOR/CT: CPT | Performed by: OTHER

## 2021-08-16 RX ORDER — CYCLOBENZAPRINE HCL 10 MG
10 TABLET ORAL 3 TIMES DAILY PRN
Status: DISCONTINUED | OUTPATIENT
Start: 2021-08-16 | End: 2021-08-17

## 2021-08-16 NOTE — PROGRESS NOTES
[facility] Hospitalist Progress Note     CC: Hospital Follow up    PCP: Maxim Patel DO       Assessment/Plan:     Principal Problem:    CNS demyelination ([facility] [address]    32 y/o f w hy of hyperthyroidism taken off methimazole 8/10 p/w c/o left sided numbness and tingli 08/15/21  0458   RBC 4.66 5.11   HGB 13.1 14.4   HCT 38.5 42.4   MCV 82.6 83.0   MCH 28.1 28.2   MCHC 34.0 34.0   RDW 12.5 12.4   NEPRELIM 6.58  --    WBC 11.6* 10.3   .0 317.0         Recent Labs   Lab 08/14/21  1537 08/15/21  0458   GLU 89 159*  1

## 2021-08-16 NOTE — CONSULTS
[facility]  Report of Consultation    [facility] Patient Status:  Observation    1985 MRN [id number]   [facility] 3SW-A Attending Dorrine Hammans, MD   Hosp Day # 0 PCP Vu Luis DO     Reason for Consultation:  Left 20    right fallopian tube removed   • OTHER SURGICAL HISTORY  age 10    precancerous tumor RIGHT knee, removed   • SALPINGECTOMY  2/1/17    laparoscopy, right salpingectomy, lysis of adhesions, vaporization of endometriosis, chromopertubation, drainage of weight 188 lb (85.3 kg), last menstrual period 07/22/2021, SpO2 99 %, not currently breastfeeding.     Gen: well developed, well nourished, no acute distress  HEENT: normocephalic  Heart; normal K3/M3, regular rate and rhythm  Lungs: clear to auscultation b enhancing foci in the left cervical cord at C7-T1 may represent small demyelinating plaques. Dictated by (CST): Ruma Ventura MD on 8/14/2021 at 9:26 PM     Finalized by (CST): Ruma Ventura MD on 8/14/2021 at 9:33 PM           Assessment:  1.  Principal

## 2021-08-16 NOTE — CM/SW NOTE
08/16/21 1100   CM/SW Referral Data   Referral Source Physician   Reason for Referral Discharge planning   Informant Patient;EMR;Other   Patient Info   Patient's Current Mental Status at Time of Assessment Alert;Oriented   Patient lives with Spouse/Sign

## 2021-08-16 NOTE — TELEPHONE ENCOUNTER
Received call from in patient SW. Pt is currently in the hospital and will need IV solumedrol out patient. Per billing at [facility] ([facility]), no PA required. LM for Dre Govea with this info. She will contact charge RN once patient is prepared for discharge.

## 2021-08-16 NOTE — PLAN OF CARE
A&Ox4. VSS. On room air. Last BM today 8/15. Voiding freely. Tylenol given for back pain. Left upper and lower extremity numbness and tingling unchanged. Patient updated on plan of care. Safety precautions in place. Call light within reach.  Will continue t

## 2021-08-16 NOTE — PLAN OF CARE
Patient reports her symptoms remains the same: numbness and tingling on the left side of her body, strength remains intact 5/5. VS are stable, remains NPO for Lumbar puncture today. Had the thoracic MRI done. Had 2 doses of Methylprednisolone.  Plan of care

## 2021-08-16 NOTE — PROGRESS NOTES
[facility]    Progress Note    Herrera Cody Mockapetris Patient Status:  Observation    1985 MRN [id number]   [facility] 3SW-A Attending Dorrine Hammans, MD   Hosp Day # 0 PCP Vu Luis DO     Subjective:  Herrera Sol Mockapetris PTP 13.7 08/16/2021       Assessment:  Patient Active Problem List:     Allergic rhinitis     Tonsil stone     Nasal septal deviation     Obesity (BMI 30-39. 9)     History of pre-eclampsia     History of herpes genitalis     Hyperthyroidism     Thyroiditis

## 2021-08-17 ENCOUNTER — TELEPHONE (OUTPATIENT)
Dept: HEMATOLOGY/ONCOLOGY | Facility: HOSPITAL | Age: 36
End: 2021-08-17

## 2021-08-17 ENCOUNTER — APPOINTMENT (OUTPATIENT)
Dept: MRI IMAGING | Facility: HOSPITAL | Age: 36
DRG: 060 | End: 2021-08-17
Attending: Other
Payer: COMMERCIAL

## 2021-08-17 ENCOUNTER — TELEPHONE (OUTPATIENT)
Dept: NEUROLOGY | Facility: CLINIC | Age: 36
End: 2021-08-17

## 2021-08-17 VITALS
RESPIRATION RATE: 18 BRPM | SYSTOLIC BLOOD PRESSURE: 128 MMHG | BODY MASS INDEX: 31.32 KG/M2 | WEIGHT: 188 LBS | TEMPERATURE: 98 F | HEART RATE: 89 BPM | DIASTOLIC BLOOD PRESSURE: 78 MMHG | HEIGHT: 65 IN | OXYGEN SATURATION: 94 %

## 2021-08-17 PROCEDURE — 70552 MRI BRAIN STEM W/DYE: CPT | Performed by: OTHER

## 2021-08-17 RX ORDER — FAMOTIDINE 20 MG/1
20 TABLET ORAL 2 TIMES DAILY
Qty: 30 TABLET | Refills: 0 | Status: SHIPPED | OUTPATIENT
Start: 2021-08-17 | End: 2021-09-22

## 2021-08-17 RX ORDER — PREDNISONE 20 MG/1
TABLET ORAL
Qty: 30 TABLET | Refills: 0 | Status: SHIPPED | OUTPATIENT
Start: 2021-08-17 | End: 2021-09-22

## 2021-08-17 NOTE — PROGRESS NOTES
[id number] [facility] Neurology Progress Note    Buras Dec Mockapetris Patient Status:  Inpatient    1985 MRN [id number]   [facility] 3SW-A Attending Jessica Goodrich MD   [facility] Day # 3 PCP Vidal Morrow DO       Chief Complaint:     Right brain w/wo CONCLUSION:  Small enhancing focus of T2/FLAIR signal hyperintensity in the posterior right upper vibha is consistent with demyelinating plaque.  Nonspecific gliosis and small neoplasm are included in the differential considerations.      MRI cerv

## 2021-08-17 NOTE — DISCHARGE SUMMARY
[facility] Internal Medicine Discharge Summary   Patient ID:  Melany Reynoso  [id number]  75 year old  6/30/1985    Admit date: 8/14/2021    Discharge date and time: 8/17/2021     Attending Physician: Berhane Bui MD     Primary Care Physician: Sean Dominguez distress, alert and oriented  CV: RRR, +s1/s2  Lungs: CTAB, good respiratory effort  Abdomen: s/nt/nd  Ext: Moves all 4 extremities, no c/c/e  Neuro: CN Intact, no focal deficits      Discharge meds     Medication List      START taking these medications acute-appearing focal gray-white matter CT attenuation changes are detected; the corticomedullary differentiation is intact. The sella turcica appears normal in size, without discrete suprasellar lesions.  The calvarial mineralization is intact, without dis numbness and tingling to left side of body, started 1 week ago  TECHNIQUE:  MRI of the brain was performed with multi-planar T1, T2-weighted images with FLAIR sequences and diffusion weighted images without and with infusion.   PATIENT STATED HISTORY:(As tr body starting 1 week ago. CONTRAST USED:  17 mL of Dotarem  FINDINGS:  CERVICAL DISC LEVELS: C2-C3:  No significant disc/facet abnormality, spinal stenosis, or foraminal narrowing.  C3-C4:  No significant disc/facet abnormality, spinal stenosis, or forami weighted sequences. PATIENT STATED HISTORY:(As transcribed by Technologist)  Per patients order new onset demyelinating disease,thoracic back pain evaluate for demyelinating.    CONTRAST USED:  17 mL of Dotarem  FINDINGS:  CORD:  Normal caliber, contour, was prepped with betadine and covered with a body drape in the usual sterile fashion. All operators present for the case performed standard pre-procedural prep including hand washing, sterile gloves, and mask.   A preprocedural time out was performed with

## 2021-08-17 NOTE — PLAN OF CARE
Pt A&Ox4. Numbness/tingling to L side unchanged. On room air. VSS. Tolerating general diet. Voiding without difficulty. Denies pain this morning. Plan for MRI of brain with contrast today. IV solu-medrol dosing per neurology.  Pt instructed to call for assi

## 2021-08-17 NOTE — PLAN OF CARE
Alert and oriented x4,V/S stable,get up to the bathroom with min. assist.HL,flushes well. On Solu-MEDROL IV as ordered. Encourage po fluids for MRI of the Brain today. Medications given as needed,N/T to left side of her body unchanged as from the day she was a

## 2021-08-17 NOTE — PAYOR COMM NOTE
--------------  ADMISSION REVIEW     Payor: Ana Morley #:  [id number]  Authorization Number: [id number]    Admit date: 8/14/21  Admit time: 11:49 PM       REVIEW DOCUMENTATION:     ED Provider Notes      ED Provider Notes signed by Corie Sanderson of pre-eclampsia 7/30/2019   • Infertility, female     ivf   • PONV (postoperative nausea and vomiting)    • SEASONAL ALLERGIES               Past Surgical History:   Procedure Laterality Date   • APPENDECTOMY  age 23   • D & C  06/26/2017   • OTHER SURGIC sensation to the left forearm compared with the right. Subjective decreased sensation to the left shin compared with the right. Sensation equal bilaterally in the thighs. No saddle anesthesia.   Patient is alert and answers questions appropriately    ED narrowing. C3-C4:  No significant disc/facet abnormality, spinal stenosis, or foraminal narrowing. C4-C5:  No significant disc/facet abnormality, spinal stenosis, or foraminal narrowing.    C5-C6:  Small central posterior disc protrusion without spinal with:  Numbness Weakness       PCP: Martha Hammond DO    History of Present Illness: 32 y/o f w hx of hyperthyroidism taken off methimazole 8/10 p/w c/o left sided numbness and tingling, began in fingers and toes and now involves whole left side, mostly num Radiology: MRI BRAIN (W+WO) (POA=28781)    Result Date: 8/14/2021  CONCLUSION:  Small enhancing focus of T2/FLAIR signal hyperintensity in the posterior right upper vibha is consistent with demyelinating plaque.   Nonspecific gliosis and small neoplasm a Route User    8/16/2021 2027 Given 10 mg Oral Yasemin Monaco RN    8/16/2021 1228 Given 10 mg Oral Joya Siemens, RN      Gadoterate Meglumine (DOTAREM) 10 MMOL/20ML injection 20 mL     Date Action Dose Route User    8/17/2021 1055 Given 20 mL [place] None (Room air)  — BS    08/16/21 0400  98.1 °F (36.7 °C)  75  18  120/69  98 %  —  None (Room air)  — RR              8/15 NEUROLOGY CONSULT NOTE  Reason for Consultation:  Left UE and LE numbness, tingling     History of Present Illness:  [address], [address] PERRL, EOMI without nystagmus, VFF, smile symmetric, sensation intact, tongue and palate midline, SCM intact; otherwise, 2-12 intact  Motor: 5/5 strength throughout, tone normal  DTR: 2+ symmetric throughout, toes downgoing bilaterally, no clonus  Sensory: lb (85.3 kg), last menstrual period 07/22/2021, SpO2 98 %,    NEUROLOGICAL:  This patient is alert and orientated x 3. Speech fluent. Able to follow simple commands. Face is symmetrical.  No Drift. Pupils equally round and reactive to light.   2+ reflexe flexeril     Hyperthyroidism: off MM 8/10, still on bb     FN:  - IVF:none  - Diet:regular     DVT Prophy:scd           Dispo: inpt    8/16 Procedure: Lumbar puncture     Pre-Procedure Diagnosis:  Numbness     Post-Procedure Diagnosis: Same     Anesthesia:

## 2021-08-17 NOTE — TELEPHONE ENCOUNTER
Destiny from [facility] has entered 1 add'l day of IV solumedrol.  Will schedule appt- pt is still currently in the hospital.

## 2021-08-17 NOTE — TELEPHONE ENCOUNTER
Received call GARY Baca and she states that patient to receive 1 additional dose of 1 gram solumedrol x 1 day as an outpatient after discharge.     Dr. Anju Carter noted dated 8/16/2021 from IP:    KRISTOPHER pending  LP with CSF WBC, protein, oligoclonal

## 2021-08-17 NOTE — PROGRESS NOTES
Pt cleared by all MD's for discharge. Discharge education completed at bedside, all questions answered. PIV removed, belongings packed. Scripts for prednisone and famotidine given to patient. Pt discharging to home.

## 2021-08-17 NOTE — CM/SW NOTE
Received call from Mable Rodriguez with [facility] who stated they have received MD Orders for IV solumedrol. Pt is scheduled for 8/18 at 2pm.  No insurance pre-approval is needed. Met with pt who is agreeable with DC Plan.   No other needs identified

## 2021-08-18 ENCOUNTER — OFFICE VISIT (OUTPATIENT)
Dept: HEMATOLOGY/ONCOLOGY | Age: 36
End: 2021-08-18
Attending: Other
Payer: COMMERCIAL

## 2021-08-18 VITALS
DIASTOLIC BLOOD PRESSURE: 91 MMHG | RESPIRATION RATE: 16 BRPM | OXYGEN SATURATION: 100 % | SYSTOLIC BLOOD PRESSURE: 147 MMHG | TEMPERATURE: 99 F | HEART RATE: 71 BPM

## 2021-08-18 DIAGNOSIS — G37.9 CNS DEMYELINATION (HCC): Primary | ICD-10-CM

## 2021-08-18 LAB — ANA SCREEN: NEGATIVE

## 2021-08-18 PROCEDURE — 96365 THER/PROPH/DIAG IV INF INIT: CPT

## 2021-08-18 PROCEDURE — 96374 THER/PROPH/DIAG INJ IV PUSH: CPT

## 2021-08-18 NOTE — PAYOR COMM NOTE
Discharge Notification    Patient Name: Luare Sims  Payor: Paul Heck #: [id number]  Authorization Number: [id number]  Admit Date/Time: 8/14/2021 3:09 PM  Discharge Date/Time: 8/17/2021 2:00 PM

## 2021-08-18 NOTE — PROGRESS NOTES
Education Record    Learner:  Patient    Disease / Diagnosis: neuropathy    Barriers / Limitations:  None   Comments:    Method:  Discussion and Reinforcement   Comments:    General Topics:  Medication, Plan of care reviewed and Fall risk and prevention

## 2021-08-19 LAB
ALBUMIN INDEX: 4 RATIO
ALBUMIN, CSF: 16 MG/DL
ALBUMIN, SERUM/PLASMA, NEPH: 3993 MG/DL
CSF IGG SYNTHESIS RATE: 0.1 MG/D
CSF IGG/ALBUMIN RATIO: 0.14 RATIO
CSF OLIGOCLONAL BANDS NUMBER: 7 BANDS
CSF OLIGOCLONAL BANDS: POSITIVE
IGG INDEX: 0.64 RATIO
IMMUNOGLOBULIN G CSF: 2.3 MG/DL
IMMUNOGLOBULIN G: 892 MG/DL

## 2021-08-20 ENCOUNTER — TELEPHONE (OUTPATIENT)
Dept: NEUROLOGY | Facility: CLINIC | Age: 36
End: 2021-08-20

## 2021-08-20 DIAGNOSIS — R20.2 PARESTHESIAS: Primary | ICD-10-CM

## 2021-08-20 RX ORDER — BUTALBITAL, ACETAMINOPHEN AND CAFFEINE 300; 40; 50 MG/1; MG/1; MG/1
1 CAPSULE ORAL EVERY 8 HOURS PRN
Qty: 30 CAPSULE | Refills: 0 | Status: SHIPPED | OUTPATIENT
Start: 2021-08-20 | End: 2021-09-22

## 2021-08-20 NOTE — TELEPHONE ENCOUNTER
Patient back- stated headache constant but does get slightly worse with positional change 7-8/10    No visual changes  Has left upper back/shoulder blade stiffness    Also feeling having difficulty focusing since started steroid    ?  Post spinal headache + stress  Plan: Rest well, hydrate, increase caffeine intake  Limit activity level  Take Fioricet prn for moderate to severe headache    Call the clinic on Monday

## 2021-08-22 ENCOUNTER — HOSPITAL ENCOUNTER (EMERGENCY)
Facility: HOSPITAL | Age: 36
Discharge: HOME | End: 2021-08-22
Attending: EMERGENCY MEDICINE
Payer: COMMERCIAL

## 2021-08-22 ENCOUNTER — ANESTHESIA (OUTPATIENT)
Dept: ANESTHESIOLOGY | Facility: HOSPITAL | Age: 36
End: 2021-08-22
Payer: COMMERCIAL

## 2021-08-22 ENCOUNTER — ANESTHESIA EVENT (OUTPATIENT)
Dept: ANESTHESIOLOGY | Facility: HOSPITAL | Age: 36
End: 2021-08-22
Payer: COMMERCIAL

## 2021-08-22 VITALS
HEART RATE: 54 BPM | HEIGHT: 65 IN | OXYGEN SATURATION: 96 % | BODY MASS INDEX: 29.99 KG/M2 | DIASTOLIC BLOOD PRESSURE: 90 MMHG | RESPIRATION RATE: 15 BRPM | TEMPERATURE: 98 F | WEIGHT: 180 LBS | SYSTOLIC BLOOD PRESSURE: 127 MMHG

## 2021-08-22 DIAGNOSIS — G97.1 SPINAL HEADACHE: Primary | ICD-10-CM

## 2021-08-22 LAB
ALBUMIN SERPL-MCNC: 3.2 G/DL (ref 3.4–5)
ALBUMIN/GLOB SERPL: 0.9 {RATIO} (ref 1–2)
ALP LIVER SERPL-CCNC: 64 U/L
ALT SERPL-CCNC: 33 U/L
ANION GAP SERPL CALC-SCNC: 3 MMOL/L (ref 0–18)
AST SERPL-CCNC: 14 U/L (ref 15–37)
BASOPHILS # BLD AUTO: 0.03 X10(3) UL (ref 0–0.2)
BASOPHILS NFR BLD AUTO: 0.2 %
BILIRUB SERPL-MCNC: 0.4 MG/DL (ref 0.1–2)
BUN BLD-MCNC: 16 MG/DL (ref 7–18)
CALCIUM BLD-MCNC: 8.5 MG/DL (ref 8.5–10.1)
CHLORIDE SERPL-SCNC: 108 MMOL/L (ref 98–112)
CO2 SERPL-SCNC: 29 MMOL/L (ref 21–32)
CREAT BLD-MCNC: 0.81 MG/DL
EOSINOPHIL # BLD AUTO: 0 X10(3) UL (ref 0–0.7)
EOSINOPHIL NFR BLD AUTO: 0 %
ERYTHROCYTE [DISTWIDTH] IN BLOOD BY AUTOMATED COUNT: 12.8 %
GLOBULIN PLAS-MCNC: 3.5 G/DL (ref 2.8–4.4)
GLUCOSE BLD-MCNC: 120 MG/DL (ref 70–99)
HCT VFR BLD AUTO: 39.9 %
HGB BLD-MCNC: 13.3 G/DL
IMM GRANULOCYTES # BLD AUTO: 0.5 X10(3) UL (ref 0–1)
IMM GRANULOCYTES NFR BLD: 3.5 %
LYMPHOCYTES # BLD AUTO: 1.98 X10(3) UL (ref 1–4)
LYMPHOCYTES NFR BLD AUTO: 13.9 %
M PROTEIN MFR SERPL ELPH: 6.7 G/DL (ref 6.4–8.2)
MCH RBC QN AUTO: 28 PG (ref 26–34)
MCHC RBC AUTO-ENTMCNC: 33.3 G/DL (ref 31–37)
MCV RBC AUTO: 84 FL
MONOCYTES # BLD AUTO: 0.23 X10(3) UL (ref 0.1–1)
MONOCYTES NFR BLD AUTO: 1.6 %
NEUTROPHILS # BLD AUTO: 11.47 X10 (3) UL (ref 1.5–7.7)
NEUTROPHILS # BLD AUTO: 11.47 X10(3) UL (ref 1.5–7.7)
NEUTROPHILS NFR BLD AUTO: 80.8 %
OSMOLALITY SERPL CALC.SUM OF ELEC: 292 MOSM/KG (ref 275–295)
PLATELET # BLD AUTO: 328 10(3)UL (ref 150–450)
POTASSIUM SERPL-SCNC: 4.3 MMOL/L (ref 3.5–5.1)
RBC # BLD AUTO: 4.75 X10(6)UL
SODIUM SERPL-SCNC: 140 MMOL/L (ref 136–145)
WBC # BLD AUTO: 14.2 X10(3) UL (ref 4–11)

## 2021-08-22 PROCEDURE — 62273 INJECT EPIDURAL PATCH: CPT

## 2021-08-22 PROCEDURE — 99284 EMERGENCY DEPT VISIT MOD MDM: CPT

## 2021-08-22 PROCEDURE — 80053 COMPREHEN METABOLIC PANEL: CPT | Performed by: EMERGENCY MEDICINE

## 2021-08-22 PROCEDURE — 62273 INJECT EPIDURAL PATCH: CPT | Performed by: STUDENT IN AN ORGANIZED HEALTH CARE EDUCATION/TRAINING PROGRAM

## 2021-08-22 PROCEDURE — 99283 EMERGENCY DEPT VISIT LOW MDM: CPT

## 2021-08-22 PROCEDURE — 36415 COLL VENOUS BLD VENIPUNCTURE: CPT

## 2021-08-22 PROCEDURE — 85025 COMPLETE CBC W/AUTO DIFF WBC: CPT | Performed by: EMERGENCY MEDICINE

## 2021-08-22 RX ORDER — BUTALBITAL, ACETAMINOPHEN AND CAFFEINE 50; 325; 40 MG/1; MG/1; MG/1
1-2 TABLET ORAL EVERY 4 HOURS PRN
Qty: 20 TABLET | Refills: 0 | Status: SHIPPED | OUTPATIENT
Start: 2021-08-22 | End: 2021-08-25

## 2021-08-22 NOTE — ED PROVIDER NOTES
Patient Seen in: [facility] Emergency Department      History   Patient presents with:  Headache    Stated Complaint: headache (blood patch)    HPI/Subjective:   HPI    72-year-old female presents emergency department who is here for a blood patch. None (Room air)       Current:/90   Pulse 54   Temp 98.4 °F (36.9 °C) (Temporal)   Resp 15   Ht 165.1 cm (5' 5\")   Wt 81.6 kg   LMP 08/19/2021 (Exact Date)   SpO2 96%   BMI 29.95 kg/m²         Physical Exam    All measures to prevent infection trans DIFFERENTIAL WITH PLATELET    Narrative: The following orders were created for panel order CBC With Differential With Platelet.   Procedure                               Abnormality         Status                     --------- dictation discrepancies may still exist                             Disposition and Plan     Clinical Impression:  Spinal headache  (primary encounter diagnosis)     Disposition:  Discharge  8/22/2021 12:18 pm    Follow-up:  Kiki Lee DO

## 2021-08-22 NOTE — ANESTHESIA PROCEDURE NOTES
Blood Patch  Performed by: Domonique Vicente MD  Authorized by:  Domonique Vicente MD     General Information and Staff    Start Time:  8/22/2021 11:30 AM  End Time:  8/22/2021 11:45 AM  Anesthesiologist:  Domonique Vicente MD  Patient Location:  ED  Site Identification

## 2021-08-22 NOTE — ED QUICK NOTES
anesthesia at [facility]. Discussing procedure with pt. Holding on IV and lab draw at this time per anesthesia.

## 2021-08-22 NOTE — ED INITIAL ASSESSMENT (HPI)
Pt presents to ER for a blood patch. Pt had a spinal tap on Tuesday and developed a HA after. Attempted treating with home medications with no relief. anesthesia told pt to come to ER. Pt is speaking clearly. Skin warm and dry.  Respirations equal and nonla

## 2021-08-23 RX ORDER — GABAPENTIN 100 MG/1
100 CAPSULE ORAL 3 TIMES DAILY
Qty: 90 CAPSULE | Refills: 0 | Status: SHIPPED | OUTPATIENT
Start: 2021-08-23 | End: 2021-09-10

## 2021-08-23 NOTE — TELEPHONE ENCOUNTER
Patient states she is still having numbness and tingling, this has not changed. Has taken FIORICET which helps little. HA pain comes in waves per patient. Is still on steroid. Is resting and off work. Is willing to come in for treatment if provider suggests. Otherwise, has appt in this office on Thursday. Routed to provider for advice.

## 2021-08-23 NOTE — TELEPHONE ENCOUNTER
Called patient - states she has been having headaches - went to ER and had blood patch with some improvement.         patient having continued paresthesias and remains on oral steroid taper with prednisone - currently 60 mg with plan to taper to 40 mg as directed    Will discuss all results of testing in office this coming appt - in meantime, trial gabapentin - start at 100 mg tid for paresthesias

## 2021-08-24 RX ORDER — FAMOTIDINE 20 MG/1
TABLET ORAL
Qty: 30 TABLET | Refills: 0 | OUTPATIENT
Start: 2021-08-24

## 2021-08-25 ENCOUNTER — TELEPHONE (OUTPATIENT)
Dept: NEUROLOGY | Facility: CLINIC | Age: 36
End: 2021-08-25

## 2021-08-25 ENCOUNTER — OFFICE VISIT (OUTPATIENT)
Dept: NEUROLOGY | Facility: CLINIC | Age: 36
End: 2021-08-25
Payer: COMMERCIAL

## 2021-08-25 VITALS
DIASTOLIC BLOOD PRESSURE: 80 MMHG | WEIGHT: 180 LBS | HEART RATE: 102 BPM | BODY MASS INDEX: 29.99 KG/M2 | RESPIRATION RATE: 16 BRPM | HEIGHT: 65 IN | SYSTOLIC BLOOD PRESSURE: 134 MMHG

## 2021-08-25 DIAGNOSIS — R20.2 PARESTHESIAS: Primary | ICD-10-CM

## 2021-08-25 DIAGNOSIS — R20.0 LEFT SIDED NUMBNESS: ICD-10-CM

## 2021-08-25 DIAGNOSIS — R90.89 ABNORMAL BRAIN MRI: ICD-10-CM

## 2021-08-25 DIAGNOSIS — G37.9 DEMYELINATING CHANGES IN BRAIN (HCC): ICD-10-CM

## 2021-08-25 PROCEDURE — 3079F DIAST BP 80-89 MM HG: CPT | Performed by: OTHER

## 2021-08-25 PROCEDURE — 3075F SYST BP GE 130 - 139MM HG: CPT | Performed by: OTHER

## 2021-08-25 PROCEDURE — 99214 OFFICE O/P EST MOD 30 MIN: CPT | Performed by: OTHER

## 2021-08-25 PROCEDURE — 3008F BODY MASS INDEX DOCD: CPT | Performed by: OTHER

## 2021-08-25 NOTE — TELEPHONE ENCOUNTER
Pt arrived for appt and provided FMLA paperwork to be completed. RN placed paperwork in the nurses bin.

## 2021-08-25 NOTE — PROGRESS NOTES
Daniela Progress Note    HPI  Patient presents with:  Hospital F/U: Possible MS Chance Venegas is a 31year old female, who was originally seen in consultation in [facility] 8/15/2021,     As per initial consultati some improvement in headaches. She continues to have tingling on the left side but otherwise denies any new focal numbness/tingling/weakness. She denies balance issues, falls, or bowel / bladder changes.   She still feels she has some abnormal sensatio SUCCINATE 25 MG Oral Tablet 24 Hr, TAKE 1 TABLET BY MOUTH EVERY DAY, Disp: 30 tablet, Rfl: 1  •  Butalbital-APAP-Caffeine -40 MG Oral Cap, Take 1 capsule by mouth every 8 (eight) hours as needed for Pain., Disp: 30 capsule, Rfl: 0  •  predniSONE 20 M bilaterally, no clonus  Sensory: intact to light touch throughout; intact to pin, vibration and proprioception but subjectively reduced on left arm and leg  Coord: FNF intact with no tremor or dysmetria; rapid alternating movements intact bilaterally  Romb 3.4 - 5.0 g/dL 3.2 (L)   Globulin      2.8 - 4.4 g/dL 3.5   A/G Ratio      1.0 - 2.0 0.9 (L)   IMMUNOGLOBULIN G      768 - 1632 mg/dL    IMMUNOGLOBULIN G CSF      0.0 - 6.0 mg/dL    Albumin, CSF      0 - 35 mg/dL    ALBUMIN, SERUM/PLASMA, NEPH      3500 mmol/L    Chloride      98 - 112 mmol/L    Carbon Dioxide, Total      21.0 - 32.0 mmol/L    ANION GAP      0 - 18 mmol/L    BUN      7 - 18 mg/dL    CREATININE      0.55 - 1.02 mg/dL    CALCIUM      8.5 - 10.1 mg/dL    CALCULATED OSMOLALITY      275 - 295 8/15/2021   WBC      4.0 - 11.0 x10(3) uL    RBC      3.80 - 5.30 x10(6)uL    Hemoglobin      12.0 - 16.0 g/dL    Hematocrit      35.0 - 48.0 %    Platelet Count      837.2 - 450.0 10(3)uL    MCV      80.0 - 100.0 fL    MCH      26.0 - 34.0 pg    MCHC INTERPRETATION          TOTAL VOLUME CSF      mL    CSF TUBE #          Color CSF      Colorless    Clarity CSF      Clear    WBC CSF      0 - 5 /mm3    RBC CSF      <1 /mm3    Case Report          FINAL DIAGNOSIS          CLINICAL INFORMATION          N presented 8/14/2021 for evaluation of new onset numbness on the left side of her body. Neurologic examination overall demonstrates continued subjective paresthesias on the left side of her body. Etiology for patient's symptoms is not entirely clear.   Dif MRI BRAIN (W+WO) (CPT=70553)        As noted above    No follow-ups on file.       Angy Manuel MD, Neurology  [facility]  Pager [phone]  8/25/2021

## 2021-09-02 ENCOUNTER — TELEPHONE (OUTPATIENT)
Dept: NEUROLOGY | Facility: CLINIC | Age: 36
End: 2021-09-02

## 2021-09-02 NOTE — TELEPHONE ENCOUNTER
Pt called to give an update on her condition from her last ov on 8/25/21. Pt stated she just finished last steroid dose today and numbness slightly better. It was discussed to change dosage medications. Pt also noted some other issues too.     Call pt

## 2021-09-02 NOTE — TELEPHONE ENCOUNTER
Pt called with the update from her office visit on 8/25/2021      RN spoke to the patient who reports continued left sided numbness. The numbness has not improved. She reports urinary urgency, frequency, retention and sexual dysfunction.    Pt is still expe

## 2021-09-02 NOTE — TELEPHONE ENCOUNTER
Called patient - states she is still having pressure over the top of the head - feels she has some sensitvity to lying on R side of head.       Patient also has been having urinary urgency and increased frequency - having decreased sensation around rectum a

## 2021-09-02 NOTE — TELEPHONE ENCOUNTER
Pt asked about [facility] paperwork status. Was advised in process. Pt stated it is due by 9/6/21.     Call pt to advise

## 2021-09-10 ENCOUNTER — TELEPHONE (OUTPATIENT)
Dept: NEUROLOGY | Facility: CLINIC | Age: 36
End: 2021-09-10

## 2021-09-10 DIAGNOSIS — R20.2 PARESTHESIAS: ICD-10-CM

## 2021-09-10 RX ORDER — GABAPENTIN 100 MG/1
200 CAPSULE ORAL 3 TIMES DAILY
Qty: 180 CAPSULE | Refills: 1 | Status: SHIPPED | OUTPATIENT
Start: 2021-09-10 | End: 2021-12-18

## 2021-09-10 NOTE — TELEPHONE ENCOUNTER
Per TE 9/2/21, was increased to 200mg TID.  Needing new Rx to reflect    Medication: Gabapentin     Date of last refill: 8/23/21 for #90/0 additional refills  [place]    Last office visit: 8/25/21  Due back to clinic additional testing or work-up would be needed.     For treatment of paresthesias, we will continue with gabapentin.   Patient is currently on 100 mg 3 times daily and we will continue to monitor for changes and adjust as needed

## 2021-09-16 NOTE — TELEPHONE ENCOUNTER
FMLA called and told patient paperwork was not specific enough. Please resend with absence clarified. Estrellita Olguinriyas will need additional days to compensate for her lab appointments, clinic visits and hospitalizations.  (5 half days per month will cover it, He

## 2021-09-16 NOTE — TELEPHONE ENCOUNTER
Pt called again to answer questions in regards what needs to be updated with her FMLA forms.     Call pt

## 2021-09-17 ENCOUNTER — TELEPHONE (OUTPATIENT)
Dept: NEUROLOGY | Facility: CLINIC | Age: 36
End: 2021-09-17

## 2021-09-17 NOTE — TELEPHONE ENCOUNTER
Patient calling because she is concerned she may have had a stroke. Would like to talk to a nurse regarding symptoms. Please advise.

## 2021-09-17 NOTE — TELEPHONE ENCOUNTER
RN spoke to the patient and was informed she was woken up with left sided pain on her head. The pain lasted 1-2 minutes and caused light flashing when she closed her eyes. Pt woke up with a headache this morning. Please advise.

## 2021-09-24 ENCOUNTER — TELEPHONE (OUTPATIENT)
Dept: NEUROLOGY | Facility: CLINIC | Age: 36
End: 2021-09-24

## 2021-09-24 NOTE — TELEPHONE ENCOUNTER
Received medical records request from [facility]. Sent copy of request to scan and original to scan stat.

## 2021-12-18 PROBLEM — E78.1 HYPERTRIGLYCERIDEMIA: Status: ACTIVE | Noted: 2021-12-18

## 2021-12-18 PROBLEM — E66.9 CLASS 1 OBESITY WITH SERIOUS COMORBIDITY AND BODY MASS INDEX (BMI) OF 31.0 TO 31.9 IN ADULT, UNSPECIFIED OBESITY TYPE: Status: ACTIVE | Noted: 2018-05-23

## 2021-12-18 PROBLEM — M26.623 BILATERAL TEMPOROMANDIBULAR JOINT PAIN: Status: ACTIVE | Noted: 2021-12-18

## 2021-12-18 PROBLEM — E66.811 CLASS 1 OBESITY WITH SERIOUS COMORBIDITY AND BODY MASS INDEX (BMI) OF 31.0 TO 31.9 IN ADULT, UNSPECIFIED OBESITY TYPE: Status: ACTIVE | Noted: 2018-05-23

## 2021-12-18 PROBLEM — G35 MS (MULTIPLE SCLEROSIS) (HCC): Status: ACTIVE | Noted: 2021-08-14

## 2022-01-10 DIAGNOSIS — R20.2 PARESTHESIAS: ICD-10-CM

## 2022-01-11 RX ORDER — GABAPENTIN 100 MG/1
200 CAPSULE ORAL 3 TIMES DAILY
Qty: 180 CAPSULE | Refills: 1 | OUTPATIENT
Start: 2022-01-11

## 2022-01-11 NOTE — TELEPHONE ENCOUNTER
Medication: Gabapentin 100 mg     Date of last refill: 09/10/21 with 1 addt  refill  [place]     Last office visit: 8/25/21  Due back to clinic per last office note:  not stated  Date next office visit scheduled:  no paresthesias, we will continue with gabapentin.  Patient is currently on 100 mg 3 times daily and we will continue to monitor for changes and adjust as needed

## 2022-01-24 PROBLEM — M26.609 TMJ DYSFUNCTION: Status: ACTIVE | Noted: 2022-01-24

## 2022-05-20 ENCOUNTER — LAB REQUISITION (OUTPATIENT)
Age: 37
End: 2022-05-20
Payer: COMMERCIAL

## 2022-05-20 DIAGNOSIS — J34.2 NASAL SEPTAL DEVIATION: ICD-10-CM

## 2022-05-20 DIAGNOSIS — R09.81 NASAL CONGESTION: ICD-10-CM

## 2022-05-20 PROCEDURE — 88300 SURGICAL PATH GROSS: CPT | Performed by: OTOLARYNGOLOGY

## 2022-05-26 ENCOUNTER — HOSPITAL ENCOUNTER (OUTPATIENT)
Dept: MAMMOGRAPHY | Facility: HOSPITAL | Age: 37
Discharge: HOME | End: 2022-05-26
Attending: PHYSICIAN ASSISTANT
Payer: COMMERCIAL

## 2022-05-26 DIAGNOSIS — N64.4 BREAST PAIN: ICD-10-CM

## 2022-05-26 DIAGNOSIS — N63.21 UNSPECIFIED LUMP IN THE LEFT BREAST, UPPER OUTER QUADRANT: ICD-10-CM

## 2022-05-26 PROCEDURE — 76642 ULTRASOUND BREAST LIMITED: CPT | Performed by: PHYSICIAN ASSISTANT

## 2022-05-26 PROCEDURE — 77062 BREAST TOMOSYNTHESIS BI: CPT | Performed by: PHYSICIAN ASSISTANT

## 2022-05-26 PROCEDURE — 77066 DX MAMMO INCL CAD BI: CPT | Performed by: PHYSICIAN ASSISTANT

## 2022-07-21 ENCOUNTER — HOSPITAL ENCOUNTER (EMERGENCY)
Facility: HOSPITAL | Age: 37
Discharge: HOME | End: 2022-07-21
Attending: EMERGENCY MEDICINE
Payer: COMMERCIAL

## 2022-07-21 ENCOUNTER — APPOINTMENT (OUTPATIENT)
Dept: CT IMAGING | Facility: HOSPITAL | Age: 37
End: 2022-07-21
Attending: EMERGENCY MEDICINE
Payer: COMMERCIAL

## 2022-07-21 VITALS
DIASTOLIC BLOOD PRESSURE: 76 MMHG | HEART RATE: 77 BPM | OXYGEN SATURATION: 98 % | BODY MASS INDEX: 31 KG/M2 | WEIGHT: 185 LBS | SYSTOLIC BLOOD PRESSURE: 132 MMHG | RESPIRATION RATE: 16 BRPM | TEMPERATURE: 98 F

## 2022-07-21 DIAGNOSIS — K58.9 IRRITABLE BOWEL SYNDROME, UNSPECIFIED TYPE: ICD-10-CM

## 2022-07-21 DIAGNOSIS — K52.9 ENTERITIS: Primary | ICD-10-CM

## 2022-07-21 LAB
ALBUMIN SERPL-MCNC: 3.7 G/DL (ref 3.4–5)
ALBUMIN/GLOB SERPL: 1.1 {RATIO} (ref 1–2)
ALP LIVER SERPL-CCNC: 39 U/L
ALT SERPL-CCNC: 17 U/L
ANION GAP SERPL CALC-SCNC: 6 MMOL/L (ref 0–18)
AST SERPL-CCNC: 8 U/L (ref 15–37)
B-HCG UR QL: NEGATIVE
BASOPHILS # BLD AUTO: 0.01 X10(3) UL (ref 0–0.2)
BASOPHILS NFR BLD AUTO: 0.2 %
BILIRUB SERPL-MCNC: 0.4 MG/DL (ref 0.1–2)
BILIRUB UR QL STRIP.AUTO: NEGATIVE
BUN BLD-MCNC: 14 MG/DL (ref 7–18)
CALCIUM BLD-MCNC: 8.9 MG/DL (ref 8.5–10.1)
CHLORIDE SERPL-SCNC: 109 MMOL/L (ref 98–112)
CLARITY UR REFRACT.AUTO: CLEAR
CO2 SERPL-SCNC: 25 MMOL/L (ref 21–32)
COLOR UR AUTO: YELLOW
CREAT BLD-MCNC: 0.69 MG/DL
EOSINOPHIL # BLD AUTO: 0.03 X10(3) UL (ref 0–0.7)
EOSINOPHIL NFR BLD AUTO: 0.6 %
ERYTHROCYTE [DISTWIDTH] IN BLOOD BY AUTOMATED COUNT: 11.9 %
GLOBULIN PLAS-MCNC: 3.5 G/DL (ref 2.8–4.4)
GLUCOSE BLD-MCNC: 93 MG/DL (ref 70–99)
GLUCOSE UR STRIP.AUTO-MCNC: NEGATIVE MG/DL
HCT VFR BLD AUTO: 36.4 %
HGB BLD-MCNC: 12.1 G/DL
IMM GRANULOCYTES # BLD AUTO: 0.02 X10(3) UL (ref 0–1)
IMM GRANULOCYTES NFR BLD: 0.4 %
KETONES UR STRIP.AUTO-MCNC: NEGATIVE MG/DL
LEUKOCYTE ESTERASE UR QL STRIP.AUTO: NEGATIVE
LIPASE SERPL-CCNC: 135 U/L (ref 73–393)
LYMPHOCYTES # BLD AUTO: 1.26 X10(3) UL (ref 1–4)
LYMPHOCYTES NFR BLD AUTO: 23.4 %
MCH RBC QN AUTO: 29.7 PG (ref 26–34)
MCHC RBC AUTO-ENTMCNC: 33.2 G/DL (ref 31–37)
MCV RBC AUTO: 89.4 FL
MONOCYTES # BLD AUTO: 0.34 X10(3) UL (ref 0.1–1)
MONOCYTES NFR BLD AUTO: 6.3 %
NEUTROPHILS # BLD AUTO: 3.72 X10 (3) UL (ref 1.5–7.7)
NEUTROPHILS # BLD AUTO: 3.72 X10(3) UL (ref 1.5–7.7)
NEUTROPHILS NFR BLD AUTO: 69.1 %
NITRITE UR QL STRIP.AUTO: NEGATIVE
OSMOLALITY SERPL CALC.SUM OF ELEC: 290 MOSM/KG (ref 275–295)
PH UR STRIP.AUTO: 7 [PH] (ref 5–8)
PLATELET # BLD AUTO: 273 10(3)UL (ref 150–450)
POTASSIUM SERPL-SCNC: 4.3 MMOL/L (ref 3.5–5.1)
PROT SERPL-MCNC: 7.2 G/DL (ref 6.4–8.2)
PROT UR STRIP.AUTO-MCNC: NEGATIVE MG/DL
RBC # BLD AUTO: 4.07 X10(6)UL
SODIUM SERPL-SCNC: 140 MMOL/L (ref 136–145)
SP GR UR STRIP.AUTO: 1.02 (ref 1–1.03)
UROBILINOGEN UR STRIP.AUTO-MCNC: 0.2 MG/DL
WBC # BLD AUTO: 5.4 X10(3) UL (ref 4–11)

## 2022-07-21 PROCEDURE — 99284 EMERGENCY DEPT VISIT MOD MDM: CPT

## 2022-07-21 PROCEDURE — 80053 COMPREHEN METABOLIC PANEL: CPT | Performed by: EMERGENCY MEDICINE

## 2022-07-21 PROCEDURE — 81025 URINE PREGNANCY TEST: CPT

## 2022-07-21 PROCEDURE — 85025 COMPLETE CBC W/AUTO DIFF WBC: CPT | Performed by: EMERGENCY MEDICINE

## 2022-07-21 PROCEDURE — 81001 URINALYSIS AUTO W/SCOPE: CPT | Performed by: EMERGENCY MEDICINE

## 2022-07-21 PROCEDURE — 96360 HYDRATION IV INFUSION INIT: CPT

## 2022-07-21 PROCEDURE — 83690 ASSAY OF LIPASE: CPT | Performed by: EMERGENCY MEDICINE

## 2022-07-21 PROCEDURE — 74177 CT ABD & PELVIS W/CONTRAST: CPT | Performed by: EMERGENCY MEDICINE

## 2022-07-21 PROCEDURE — 99285 EMERGENCY DEPT VISIT HI MDM: CPT

## 2022-07-21 RX ORDER — DICYCLOMINE HCL 20 MG
20 TABLET ORAL 4 TIMES DAILY PRN
Qty: 30 TABLET | Refills: 0 | Status: SHIPPED | OUTPATIENT
Start: 2022-07-21 | End: 2022-08-20

## 2022-07-21 RX ORDER — IOHEXOL 350 MG/ML
100 INJECTION, SOLUTION INTRAVENOUS
Status: COMPLETED | OUTPATIENT
Start: 2022-07-21 | End: 2022-07-21

## 2022-07-21 NOTE — ED INITIAL ASSESSMENT (HPI)
Pt presents to ed with abdominal discomfort for a few weeks, pt reports constant feeling of bloating

## 2022-08-30 NOTE — PROGRESS NOTES
[facility]      Labor Progress Note    Reji Berrios Patient Status:  Inpatient    1985 MRN [id number]   Location [address] [facility] Attending Barbra Bernardo MD   Hosp Day # 1 PCP Raad Velasco DO      SUBJECTIVE:    Inter Patient is given Ativan 1 mg at 18:33 for anxiety, will be monitored for effectiveness.

## 2023-08-26 ENCOUNTER — HOSPITAL ENCOUNTER (EMERGENCY)
Facility: HOSPITAL | Age: 38
Discharge: HOME | End: 2023-08-26
Attending: EMERGENCY MEDICINE
Payer: COMMERCIAL

## 2023-08-26 ENCOUNTER — APPOINTMENT (OUTPATIENT)
Dept: GENERAL RADIOLOGY | Facility: HOSPITAL | Age: 38
End: 2023-08-26
Payer: COMMERCIAL

## 2023-08-26 VITALS
HEART RATE: 98 BPM | DIASTOLIC BLOOD PRESSURE: 86 MMHG | RESPIRATION RATE: 12 BRPM | HEIGHT: 65 IN | SYSTOLIC BLOOD PRESSURE: 126 MMHG | WEIGHT: 180 LBS | TEMPERATURE: 98 F | OXYGEN SATURATION: 99 % | BODY MASS INDEX: 29.99 KG/M2

## 2023-08-26 DIAGNOSIS — R07.89 CHEST PAIN, ATYPICAL: Primary | ICD-10-CM

## 2023-08-26 DIAGNOSIS — M94.0 COSTOCHONDRITIS, ACUTE: ICD-10-CM

## 2023-08-26 DIAGNOSIS — B34.9 VIRAL SYNDROME: ICD-10-CM

## 2023-08-26 LAB
ALBUMIN SERPL-MCNC: 4.1 G/DL (ref 3.4–5)
ALBUMIN/GLOB SERPL: 1.1 {RATIO} (ref 1–2)
ALP LIVER SERPL-CCNC: 75 U/L
ALT SERPL-CCNC: 24 U/L
ANION GAP SERPL CALC-SCNC: 6 MMOL/L (ref 0–18)
AST SERPL-CCNC: 14 U/L (ref 15–37)
B-HCG UR QL: NEGATIVE
BASOPHILS # BLD AUTO: 0.02 X10(3) UL (ref 0–0.2)
BASOPHILS NFR BLD AUTO: 0.4 %
BILIRUB SERPL-MCNC: 0.5 MG/DL (ref 0.1–2)
BUN BLD-MCNC: 15 MG/DL (ref 7–18)
CALCIUM BLD-MCNC: 9.2 MG/DL (ref 8.5–10.1)
CHLORIDE SERPL-SCNC: 105 MMOL/L (ref 98–112)
CO2 SERPL-SCNC: 27 MMOL/L (ref 21–32)
CREAT BLD-MCNC: 0.76 MG/DL
D DIMER PPP FEU-MCNC: <0.27 UG/ML FEU (ref ?–0.5)
EGFRCR SERPLBLD CKD-EPI 2021: 104 ML/MIN/1.73M2 (ref 60–?)
EOSINOPHIL # BLD AUTO: 0.05 X10(3) UL (ref 0–0.7)
EOSINOPHIL NFR BLD AUTO: 1.1 %
ERYTHROCYTE [DISTWIDTH] IN BLOOD BY AUTOMATED COUNT: 11.5 %
GLOBULIN PLAS-MCNC: 3.6 G/DL (ref 2.8–4.4)
GLUCOSE BLD-MCNC: 97 MG/DL (ref 70–99)
HCT VFR BLD AUTO: 39.7 %
HGB BLD-MCNC: 14.3 G/DL
IMM GRANULOCYTES # BLD AUTO: 0.02 X10(3) UL (ref 0–1)
IMM GRANULOCYTES NFR BLD: 0.4 %
LYMPHOCYTES # BLD AUTO: 1.19 X10(3) UL (ref 1–4)
LYMPHOCYTES NFR BLD AUTO: 26.6 %
MAGNESIUM SERPL-MCNC: 1.9 MG/DL (ref 1.6–2.6)
MCH RBC QN AUTO: 30.9 PG (ref 26–34)
MCHC RBC AUTO-ENTMCNC: 36 G/DL (ref 31–37)
MCV RBC AUTO: 85.7 FL
MONOCYTES # BLD AUTO: 0.32 X10(3) UL (ref 0.1–1)
MONOCYTES NFR BLD AUTO: 7.1 %
NEUTROPHILS # BLD AUTO: 2.88 X10 (3) UL (ref 1.5–7.7)
NEUTROPHILS # BLD AUTO: 2.88 X10(3) UL (ref 1.5–7.7)
NEUTROPHILS NFR BLD AUTO: 64.4 %
OSMOLALITY SERPL CALC.SUM OF ELEC: 287 MOSM/KG (ref 275–295)
PLATELET # BLD AUTO: 271 10(3)UL (ref 150–450)
POTASSIUM SERPL-SCNC: 4 MMOL/L (ref 3.5–5.1)
PROT SERPL-MCNC: 7.7 G/DL (ref 6.4–8.2)
RBC # BLD AUTO: 4.63 X10(6)UL
SARS-COV-2 RNA RESP QL NAA+PROBE: NOT DETECTED
SODIUM SERPL-SCNC: 138 MMOL/L (ref 136–145)
TROPONIN I HIGH SENSITIVITY: 3 NG/L
WBC # BLD AUTO: 4.5 X10(3) UL (ref 4–11)

## 2023-08-26 PROCEDURE — 99285 EMERGENCY DEPT VISIT HI MDM: CPT

## 2023-08-26 PROCEDURE — 85025 COMPLETE CBC W/AUTO DIFF WBC: CPT

## 2023-08-26 PROCEDURE — 83735 ASSAY OF MAGNESIUM: CPT | Performed by: EMERGENCY MEDICINE

## 2023-08-26 PROCEDURE — 71045 X-RAY EXAM CHEST 1 VIEW: CPT

## 2023-08-26 PROCEDURE — 80053 COMPREHEN METABOLIC PANEL: CPT

## 2023-08-26 PROCEDURE — 84484 ASSAY OF TROPONIN QUANT: CPT

## 2023-08-26 PROCEDURE — 93010 ELECTROCARDIOGRAM REPORT: CPT

## 2023-08-26 PROCEDURE — 85379 FIBRIN DEGRADATION QUANT: CPT | Performed by: EMERGENCY MEDICINE

## 2023-08-26 PROCEDURE — 96360 HYDRATION IV INFUSION INIT: CPT

## 2023-08-26 PROCEDURE — 93005 ELECTROCARDIOGRAM TRACING: CPT

## 2023-08-26 PROCEDURE — 81025 URINE PREGNANCY TEST: CPT

## 2023-08-26 PROCEDURE — 87430 STREP A AG IA: CPT | Performed by: EMERGENCY MEDICINE

## 2023-08-26 PROCEDURE — 99284 EMERGENCY DEPT VISIT MOD MDM: CPT

## 2023-08-26 RX ORDER — MULTIVIT-MIN/IRON/FOLIC ACID/K 18-600-40
CAPSULE ORAL
COMMUNITY

## 2023-08-26 RX ORDER — IBUPROFEN 800 MG/1
800 TABLET ORAL DAILY
COMMUNITY

## 2023-08-26 RX ORDER — ACETAMINOPHEN 500 MG
1000 TABLET ORAL ONCE
Status: COMPLETED | OUTPATIENT
Start: 2023-08-26 | End: 2023-08-26

## 2023-08-26 RX ORDER — PSEUDOEPHEDRINE HYDROCHLORIDE 60 MG/1
60 TABLET, FILM COATED ORAL DAILY
COMMUNITY

## 2023-08-26 NOTE — ED INITIAL ASSESSMENT (HPI)
C/o chest pain at 1am; stabbing/aching; cont.  And kept her up most of the night; nothing that relieves it; no radiating mid lower; denies fever, new medications; no hx of cardiac; getting over a recent cold; didn't covid test

## 2023-08-27 LAB
ATRIAL RATE: 90 BPM
P AXIS: 48 DEGREES
P-R INTERVAL: 132 MS
Q-T INTERVAL: 326 MS
QRS DURATION: 80 MS
QTC CALCULATION (BEZET): 398 MS
R AXIS: 71 DEGREES
T AXIS: 58 DEGREES
VENTRICULAR RATE: 90 BPM

## 2024-11-25 ENCOUNTER — APPOINTMENT (OUTPATIENT)
Dept: GENERAL RADIOLOGY | Facility: HOSPITAL | Age: 39
End: 2024-11-25
Attending: EMERGENCY MEDICINE
Payer: COMMERCIAL

## 2024-11-25 ENCOUNTER — HOSPITAL ENCOUNTER (EMERGENCY)
Facility: HOSPITAL | Age: 39
Discharge: HOME | End: 2024-11-25
Attending: EMERGENCY MEDICINE
Payer: COMMERCIAL

## 2024-11-25 VITALS
WEIGHT: 150 LBS | DIASTOLIC BLOOD PRESSURE: 78 MMHG | TEMPERATURE: 98 F | OXYGEN SATURATION: 99 % | HEART RATE: 90 BPM | BODY MASS INDEX: 24.99 KG/M2 | HEIGHT: 65 IN | SYSTOLIC BLOOD PRESSURE: 122 MMHG | RESPIRATION RATE: 16 BRPM

## 2024-11-25 DIAGNOSIS — S00.33XA CONTUSION OF NOSE, INITIAL ENCOUNTER: Primary | ICD-10-CM

## 2024-11-25 PROCEDURE — 99283 EMERGENCY DEPT VISIT LOW MDM: CPT

## 2024-11-25 PROCEDURE — 70160 X-RAY EXAM OF NASAL BONES: CPT | Performed by: EMERGENCY MEDICINE
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
## 2024-11-26 NOTE — ED PROVIDER NOTES
Patient Seen in: [facility] Emergency Department      History     Chief Complaint   Patient presents with    Facial Injury     Stated Complaint: pain in pressure to nose after being struck in the face    Subjective:   HPI      39-year-old female presents emergency room for evaluation of facial injury.  Patient states that her child accidentally struck her in the nose with his head.  Patient states she feels some swelling and thought she heard a \"crack \"when the injury occurred.  Denied any bleeding.  Denies any other pain.  Denies headache or neck pain.  Denies dental pain.    Objective:     Past Medical History:    Herpes    History of pre-eclampsia    Infertility, female    ivf    PONV (postoperative nausea and vomiting)    SEASONAL ALLERGIES    Thyroid disease              Past Surgical History:   Procedure Laterality Date    Appendectomy  age 22    D & c  06/26/2017    Other surgical history  age 22    right fallopian tube removed    Other surgical history  age 12    precancerous tumor RIGHT knee, removed    Salpingectomy  2/1/17    laparoscopy, right salpingectomy, lysis of adhesions, vaporization of endometriosis, chromopertubation, drainage of ovarian cyst                Social History     Socioeconomic History    Marital status:    Tobacco Use    Smoking status: Never    Smokeless tobacco: Never   Vaping Use    Vaping status: Never Used   Substance and Sexual Activity    Alcohol use: Yes     Comment: socially    Drug use: No    Sexual activity: Yes     Partners: Male   Other Topics Concern    Caffeine Concern Yes     Comment: 2-3 cups a day    Exercise No                  Physical Exam     ED Triage Vitals   BP 11/25/24 1535 125/85   Pulse 11/25/24 1535 96   Resp 11/25/24 1535 18   Temp 11/25/24 1535 97.7 °F (36.5 °C)   Temp src 11/25/24 1535 Temporal   SpO2 11/25/24 1535 100 %   O2 Device 11/25/24 1708 None (Room air)       Current Vitals:   Vital Signs  BP: 122/78  Pulse: 90  Resp: 16  Temp:  97.7 °F (36.5 °C)  Temp src: Temporal    Oxygen Therapy  SpO2: 99 %  O2 Device: None (Room air)        Physical Exam  GENERAL: Patient is awake, alert, well-appearing, in no acute distress.  HEENT: Pupils equal round reactive to light, extraocular muscles are intact, there is no scleral icterus.  Mucous membranes are moist, oropharynx is clear, uvula midline.  No dental trauma.  No facial bone tenderness or step-off.  There is mild tenderness with slight swelling without ecchymosis to the nose.  There is no septal hematomas.  There is no blood in the nares.    Scalp is atraumatic.  NECK: No cervical spine tenderness   NEUROLOGIC EXAM: Tongue midline, no facial drooping, no ptosis,    ED Course   Labs Reviewed - No data to display                MDM        Differential diagnosis before testing includes but not limited to nasal fracture, facial contusion, which is a medical condition that poses a threat to life/function    Radiographic images  I personally reviewed the radiographs and my individual interpretation shows no nasal fractures  I also reviewed the official reports that showed nasal bone x-ray no acute disease noted per radiologist    Course of Events during Emergency Room Visit include x-ray of the nasal bones performed, no acute findings were noted.  Exam is consistent with facial contusion.  Discussed supportive care including icing, may alternate ibuprofen Tolazine for pain.  Follow-up with primary care and return to ER for any change or symptoms.  Patient agrees plan was discharged good condition.    Shared decision making was utilized       Discharge  I have discussed with the patient the results of test, differential diagnosis, treatment plan, warning signs and symptoms which should prompt immediate return.  They expressed understanding of these instructions and agrees to the following plan provided.  They were given written discharge instructions and agrees to return for any concerns and voiced  understanding and all questions were answered.    Note to patient: The 21st Century Cures Act makes medical notes like these available to patients in the interest of transparency. However, this is a medical document intended as peer to peer communication. It is written in medical language and may contain abbreviations or verbiage that are unfamiliar. It may appear blunt or direct. Medical documents are intended to carry relevant information, facts as evident, and the clinical opinion of the practitioner.                 Medical Decision Making      Disposition and Plan     Clinical Impression:  1. Contusion of nose, initial encounter         Disposition:  Discharge  11/25/2024  4:52 pm    Follow-up:  Claritza Oakley DO  [address] [facility]  [address] [place] [id number]  [phone]    Follow up in 2 day(s)            Medications Prescribed:  Discharge Medication List as of 11/25/2024  5:09 PM              Supplementary Documentation:

## 2025-03-05 ENCOUNTER — HOSPITAL ENCOUNTER (EMERGENCY)
Age: 40
Discharge: HOME | End: 2025-03-05
Attending: EMERGENCY MEDICINE
Payer: COMMERCIAL

## 2025-03-05 ENCOUNTER — APPOINTMENT (OUTPATIENT)
Dept: GENERAL RADIOLOGY | Age: 40
End: 2025-03-05
Payer: COMMERCIAL

## 2025-03-05 VITALS
TEMPERATURE: 98 F | HEART RATE: 93 BPM | RESPIRATION RATE: 18 BRPM | WEIGHT: 140 LBS | BODY MASS INDEX: 23.32 KG/M2 | DIASTOLIC BLOOD PRESSURE: 81 MMHG | OXYGEN SATURATION: 98 % | SYSTOLIC BLOOD PRESSURE: 123 MMHG | HEIGHT: 65 IN

## 2025-03-05 DIAGNOSIS — S93.401A SPRAIN OF RIGHT ANKLE, UNSPECIFIED LIGAMENT, INITIAL ENCOUNTER: Primary | ICD-10-CM

## 2025-03-05 PROCEDURE — 73610 X-RAY EXAM OF ANKLE: CPT

## 2025-03-05 PROCEDURE — 99284 EMERGENCY DEPT VISIT MOD MDM: CPT

## 2025-03-05 RX ORDER — IBUPROFEN 600 MG/1
600 TABLET, FILM COATED ORAL ONCE
Status: COMPLETED | OUTPATIENT
Start: 2025-03-05 | End: 2025-03-05

## 2025-03-05 RX ORDER — BACLOFEN 10 MG/1
1 TABLET ORAL 3 TIMES DAILY
COMMUNITY
Start: 2023-11-14

## 2025-03-05 RX ORDER — DULOXETIN HYDROCHLORIDE 30 MG/1
30 CAPSULE, DELAYED RELEASE ORAL 2 TIMES DAILY
COMMUNITY
Start: 2024-03-01

## 2025-03-05 RX ORDER — SPIRONOLACTONE 50 MG/1
50 TABLET, FILM COATED ORAL DAILY
COMMUNITY

## 2025-03-05 RX ORDER — CYCLOBENZAPRINE HCL 10 MG
1 TABLET ORAL NIGHTLY PRN
COMMUNITY
Start: 2024-02-13

## 2025-03-05 RX ORDER — METFORMIN HYDROCHLORIDE 500 MG/1
1000 TABLET, EXTENDED RELEASE ORAL 2 TIMES DAILY WITH MEALS
COMMUNITY
Start: 2025-01-16

## 2025-03-05 NOTE — ED PROVIDER NOTES
Patient Seen in: [facility] In [place]      History     Chief Complaint   Patient presents with    Leg or Foot Injury     Stated Complaint: right ankle injury    Subjective:   HPI      Patient states that she had to quickly get up and check on her dog after sitting on the ground today and states that her foot had fallen asleep so than her ankle buckled and bent underneath her and she felt a pop.  She complains of pain and swelling to ankle since then.  Denies head injury or LOC or any other injuries from the fall.  States that it has been too painful to bear weight.  Denies weakness/paresthesias.  Denies any other complaints or concerns at this time.    Objective:     Past Medical History:    Herpes    History of pre-eclampsia    Infertility, female    ivf    PONV (postoperative nausea and vomiting)    SEASONAL ALLERGIES    Thyroid disease              Past Surgical History:   Procedure Laterality Date    Appendectomy  age 22    D & c  06/26/2017    Other surgical history  age 22    right fallopian tube removed    Other surgical history  age 12    precancerous tumor RIGHT knee, removed    Salpingectomy  2/1/17    laparoscopy, right salpingectomy, lysis of adhesions, vaporization of endometriosis, chromopertubation, drainage of ovarian cyst                Social History     Socioeconomic History    Marital status:    Tobacco Use    Smoking status: Never    Smokeless tobacco: Never   Vaping Use    Vaping status: Never Used   Substance and Sexual Activity    Alcohol use: Yes     Comment: socially    Drug use: No    Sexual activity: Yes     Partners: Male   Other Topics Concern    Caffeine Concern Yes     Comment: 2-3 cups a day    Exercise No                  Physical Exam     ED Triage Vitals [03/05/25 1336]   /61   Pulse 93   Resp 16   Temp 98.2 °F (36.8 °C)   Temp src Temporal   SpO2 97 %   O2 Device None (Room air)       Current Vitals:   Vital Signs  BP: 123/81  Pulse: 93  Resp:  18  Temp: 98.2 °F (36.8 °C)  Temp src: Temporal    Oxygen Therapy  SpO2: 98 %  O2 Device: None (Room air)        Physical Exam  Vitals and nursing note reviewed.   Constitutional:       Appearance: Normal appearance.   HENT:      Head: Normocephalic.   Eyes:      Conjunctiva/sclera: Conjunctivae normal.   Pulmonary:      Effort: Pulmonary effort is normal.   Musculoskeletal:      Right ankle: Swelling and ecchymosis present. No deformity. Tenderness present over the lateral malleolus. Normal pulse.      Right Achilles Tendon: Normal.   Skin:     General: Skin is warm and dry.   Neurological:      General: No focal deficit present.      Mental Status: She is alert.   Psychiatric:         Mood and Affect: Mood normal.             ED Course   Labs Reviewed - No data to display         XR ANKLE (MIN 3 VIEWS), RIGHT (CPT=73610)    Result Date: 3/5/2025  PROCEDURE:  XR ANKLE (MIN 3 VIEWS), RIGHT (CPT=73610)  TECHNIQUE:  Three views were obtained.  COMPARISON:  None.  INDICATIONS:  right ankle injury  PATIENT STATED HISTORY: (As transcribed by Technologist)  Right ankle pain lateral, anterior and posterior to joint. Injured post fall 1hr ago.    FINDINGS:  Negative for fracture, dislocation, deformity or other acute bony abnormality. There is soft tissue swelling present the mild-moderate anterior ankle most likely reflecting contusion in the setting of recent injury.             CONCLUSION:  Soft tissue swelling. No acute fracture or other acute bony process.   LOCATION:  [facility]   Dictated by (CST): Rony Cotto MD on 3/05/2025 at 2:14 PM     Finalized by (CST): Rony Cotto MD on 3/05/2025 at 2:15 PM        I reviewed x-ray images personally and see no obvious fracture or dislocation.       MDM      Differential diagnosis includes but is not limited to fracture, strain/sprain, dislocation.    Patient well-appearing, nontoxic.  Discussed x-rays negative for acute fracture.  Discussed likely sprain.  Plan for Ace  wrap and crutches as needed.  I advised RICE and over-the-counter medications and other supportive care at home, follow-up and provided return precautions.  Patient declined prescription for stronger pain medication.  She verbalized understanding agreement of plan.  Discussed case with Dr. Ventura who has seen and evaluated patient personally and agrees with plan.         Medical Decision Making      Disposition and Plan     Clinical Impression:  1. Sprain of right ankle, unspecified ligament, initial encounter         Disposition:  Discharge  3/5/2025  3:19 pm    Follow-up:  Sean Morris MD  [address] [id number] [phone]    Follow up      [facility] in [place]  [address] [facility] [id number] [phone]  Follow up  As needed, If symptoms worsen          Medications Prescribed:  Discharge Medication List as of 3/5/2025  3:32 PM              Supplementary Documentation:

## 2025-03-05 NOTE — DISCHARGE INSTRUCTIONS
Tylenol/motrin as needed for pain.  Return for new/worsening symptoms (ie increased pain/swelling, weakness, etc)

## 2025-05-12 ENCOUNTER — ORDER TRANSCRIPTION (OUTPATIENT)
Dept: PHYSICAL THERAPY | Facility: HOSPITAL | Age: 40
End: 2025-05-12

## 2025-05-12 DIAGNOSIS — R49.0 MUSCLE TENSION DYSPHONIA: Primary | ICD-10-CM

## (undated) DEVICE — SOL  .9 1000ML BTL

## (undated) DEVICE — GLOVE BIOGEL M SURG SZ 7

## (undated) DEVICE — CANISTER SAFETOUCH SYST DISP

## (undated) DEVICE — GYN CDS: Brand: MEDLINE INDUSTRIES, INC.

## (undated) DEVICE — KENDALL SCD EXPRESS SLEEVES, KNEE LENGTH, MEDIUM: Brand: KENDALL SCD

## (undated) DEVICE — TUBING SUCTION COLLECTION SET

## (undated) DEVICE — CURRETTE 8MM CVD

## (undated) NOTE — Clinical Note
IMPRESSION:  1.  IUP at  20 0/7 wks  2. Scan consistent with dates   3. No ultrasound evidence of structural abnormalities are seen 4.   IVFRecommendations:  1.  fetal echocardiogram in 4wks

## (undated) NOTE — Clinical Note
Continue care with Dr. Santillan Keep ultrasound at 32 weeks. Weekly NST's at 36 weeks.  testing would be advised sooner if gestational hypertension develops. Low-dose  mg (1.5 tabs) daily

## (undated) NOTE — LETTER
2021      Regarding: Christopher oRse,  1985      To Whom it May Concern:    Ms. Katty Welch is under my care for treatment of a medical condition.  She was hospitalized and required post-discharge medical care which rendered her un